# Patient Record
Sex: MALE | Race: WHITE | NOT HISPANIC OR LATINO | URBAN - METROPOLITAN AREA
[De-identification: names, ages, dates, MRNs, and addresses within clinical notes are randomized per-mention and may not be internally consistent; named-entity substitution may affect disease eponyms.]

---

## 2017-11-24 ENCOUNTER — INPATIENT (INPATIENT)
Facility: HOSPITAL | Age: 62
LOS: 3 days | Discharge: HOME | DRG: 855 | End: 2017-11-28
Attending: INTERNAL MEDICINE | Admitting: HOSPITALIST
Payer: COMMERCIAL

## 2017-11-24 VITALS
WEIGHT: 149.91 LBS | OXYGEN SATURATION: 99 % | SYSTOLIC BLOOD PRESSURE: 144 MMHG | DIASTOLIC BLOOD PRESSURE: 75 MMHG | HEART RATE: 108 BPM | HEIGHT: 65 IN | TEMPERATURE: 101 F | RESPIRATION RATE: 16 BRPM

## 2017-11-24 DIAGNOSIS — Z98.890 OTHER SPECIFIED POSTPROCEDURAL STATES: Chronic | ICD-10-CM

## 2017-11-24 LAB
ALBUMIN SERPL ELPH-MCNC: 3.8 G/DL — SIGNIFICANT CHANGE UP (ref 3.3–5)
ALP SERPL-CCNC: 111 U/L — SIGNIFICANT CHANGE UP (ref 40–120)
ALT FLD-CCNC: 29 U/L — SIGNIFICANT CHANGE UP (ref 12–78)
ANION GAP SERPL CALC-SCNC: 9 MMOL/L — SIGNIFICANT CHANGE UP (ref 5–17)
AST SERPL-CCNC: 21 U/L — SIGNIFICANT CHANGE UP (ref 15–37)
BILIRUB SERPL-MCNC: 1 MG/DL — SIGNIFICANT CHANGE UP (ref 0.2–1.2)
BUN SERPL-MCNC: 16 MG/DL — SIGNIFICANT CHANGE UP (ref 7–23)
CALCIUM SERPL-MCNC: 8.9 MG/DL — SIGNIFICANT CHANGE UP (ref 8.5–10.1)
CHLORIDE SERPL-SCNC: 105 MMOL/L — SIGNIFICANT CHANGE UP (ref 96–108)
CO2 SERPL-SCNC: 27 MMOL/L — SIGNIFICANT CHANGE UP (ref 22–31)
CREAT SERPL-MCNC: 1.3 MG/DL — SIGNIFICANT CHANGE UP (ref 0.5–1.3)
GLUCOSE SERPL-MCNC: 140 MG/DL — HIGH (ref 70–99)
HCT VFR BLD CALC: 45.6 % — SIGNIFICANT CHANGE UP (ref 39–50)
HGB BLD-MCNC: 15.1 G/DL — SIGNIFICANT CHANGE UP (ref 13–17)
LACTATE SERPL-SCNC: 1.7 MMOL/L — SIGNIFICANT CHANGE UP (ref 0.7–2)
MCHC RBC-ENTMCNC: 31.7 PG — SIGNIFICANT CHANGE UP (ref 27–34)
MCHC RBC-ENTMCNC: 33 GM/DL — SIGNIFICANT CHANGE UP (ref 32–36)
MCV RBC AUTO: 95.9 FL — SIGNIFICANT CHANGE UP (ref 80–100)
PLATELET # BLD AUTO: 334 K/UL — SIGNIFICANT CHANGE UP (ref 150–400)
POTASSIUM SERPL-MCNC: 3.5 MMOL/L — SIGNIFICANT CHANGE UP (ref 3.5–5.3)
POTASSIUM SERPL-SCNC: 3.5 MMOL/L — SIGNIFICANT CHANGE UP (ref 3.5–5.3)
PROT SERPL-MCNC: 7.7 G/DL — SIGNIFICANT CHANGE UP (ref 6–8.3)
RBC # BLD: 4.75 M/UL — SIGNIFICANT CHANGE UP (ref 4.2–5.8)
RBC # FLD: 12.3 % — SIGNIFICANT CHANGE UP (ref 10.3–14.5)
SODIUM SERPL-SCNC: 141 MMOL/L — SIGNIFICANT CHANGE UP (ref 135–145)
WBC # BLD: 19.1 K/UL — HIGH (ref 3.8–10.5)
WBC # FLD AUTO: 19.1 K/UL — HIGH (ref 3.8–10.5)

## 2017-11-24 PROCEDURE — 71010: CPT | Mod: 26

## 2017-11-24 PROCEDURE — 93010 ELECTROCARDIOGRAM REPORT: CPT

## 2017-11-24 RX ORDER — MUPIROCIN 20 MG/G
1 OINTMENT TOPICAL ONCE
Qty: 0 | Refills: 0 | Status: COMPLETED | OUTPATIENT
Start: 2017-11-24 | End: 2017-11-24

## 2017-11-24 RX ORDER — VANCOMYCIN HCL 1 G
1000 VIAL (EA) INTRAVENOUS ONCE
Qty: 0 | Refills: 0 | Status: COMPLETED | OUTPATIENT
Start: 2017-11-24 | End: 2017-11-24

## 2017-11-24 RX ORDER — SODIUM CHLORIDE 9 MG/ML
1000 INJECTION INTRAMUSCULAR; INTRAVENOUS; SUBCUTANEOUS
Qty: 0 | Refills: 0 | Status: COMPLETED | OUTPATIENT
Start: 2017-11-24 | End: 2017-11-24

## 2017-11-24 RX ORDER — PIPERACILLIN AND TAZOBACTAM 4; .5 G/20ML; G/20ML
3.38 INJECTION, POWDER, LYOPHILIZED, FOR SOLUTION INTRAVENOUS ONCE
Qty: 0 | Refills: 0 | Status: COMPLETED | OUTPATIENT
Start: 2017-11-24 | End: 2017-11-24

## 2017-11-24 RX ADMIN — Medication 250 MILLIGRAM(S): at 22:29

## 2017-11-24 RX ADMIN — MUPIROCIN 1 APPLICATION(S): 20 OINTMENT TOPICAL at 22:52

## 2017-11-24 RX ADMIN — PIPERACILLIN AND TAZOBACTAM 200 GRAM(S): 4; .5 INJECTION, POWDER, LYOPHILIZED, FOR SOLUTION INTRAVENOUS at 21:42

## 2017-11-24 RX ADMIN — SODIUM CHLORIDE 1000 MILLILITER(S): 9 INJECTION INTRAMUSCULAR; INTRAVENOUS; SUBCUTANEOUS at 22:28

## 2017-11-24 RX ADMIN — SODIUM CHLORIDE 1000 MILLILITER(S): 9 INJECTION INTRAMUSCULAR; INTRAVENOUS; SUBCUTANEOUS at 21:41

## 2017-11-24 NOTE — ED PROVIDER NOTE - ENMT, MLM
Airway patent, Nasal  swelling tender, right nares anterior and superior pain and swelling . Mouth with normal mucosa. Throat has no vesicles, no oropharyngeal exudates and uvula is midline.

## 2017-11-24 NOTE — ED PROVIDER NOTE - SIGNIFICANT NEGATIVE FINDINGS
no chest pain, no SOB, no palpitations, no n/v , no urinary symptoms, no bleeding. no neuro changes.

## 2017-11-24 NOTE — ED PROVIDER NOTE - OBJECTIVE STATEMENT
63 y/o WM H/O HTN C/O Nasal pain, swelling, redness, upper lip swelling, intra nasal pain, headache increasing over the past two days. He developed a fever of 100.6F. He usually trims his nasal hairs but he doesn't recall this episode being related to that. No CP, No SOB, No throat pain, no difficulty swallowing.

## 2017-11-24 NOTE — ED ADULT NURSE NOTE - OBJECTIVE STATEMENT
Patient alert and oriented x 4 complaining of right nostril abscess started 4 ago noted with swelling as stated it all started when he was cutting hair from his nostril.

## 2017-11-25 DIAGNOSIS — I10 ESSENTIAL (PRIMARY) HYPERTENSION: ICD-10-CM

## 2017-11-25 DIAGNOSIS — L03.90 CELLULITIS, UNSPECIFIED: ICD-10-CM

## 2017-11-25 DIAGNOSIS — Z29.9 ENCOUNTER FOR PROPHYLACTIC MEASURES, UNSPECIFIED: ICD-10-CM

## 2017-11-25 DIAGNOSIS — M50.30 OTHER CERVICAL DISC DEGENERATION, UNSPECIFIED CERVICAL REGION: ICD-10-CM

## 2017-11-25 DIAGNOSIS — M54.2 CERVICALGIA: ICD-10-CM

## 2017-11-25 LAB
ANION GAP SERPL CALC-SCNC: 6 MMOL/L — SIGNIFICANT CHANGE UP (ref 5–17)
APTT BLD: 31.7 SEC — SIGNIFICANT CHANGE UP (ref 27.5–37.4)
BUN SERPL-MCNC: 12 MG/DL — SIGNIFICANT CHANGE UP (ref 7–23)
CALCIUM SERPL-MCNC: 8.8 MG/DL — SIGNIFICANT CHANGE UP (ref 8.5–10.1)
CHLORIDE SERPL-SCNC: 107 MMOL/L — SIGNIFICANT CHANGE UP (ref 96–108)
CO2 SERPL-SCNC: 29 MMOL/L — SIGNIFICANT CHANGE UP (ref 22–31)
CREAT SERPL-MCNC: 0.95 MG/DL — SIGNIFICANT CHANGE UP (ref 0.5–1.3)
GLUCOSE SERPL-MCNC: 109 MG/DL — HIGH (ref 70–99)
HCT VFR BLD CALC: 41.7 % — SIGNIFICANT CHANGE UP (ref 39–50)
HGB BLD-MCNC: 13.6 G/DL — SIGNIFICANT CHANGE UP (ref 13–17)
INR BLD: 1.12 RATIO — SIGNIFICANT CHANGE UP (ref 0.88–1.16)
MCHC RBC-ENTMCNC: 31.3 PG — SIGNIFICANT CHANGE UP (ref 27–34)
MCHC RBC-ENTMCNC: 32.6 GM/DL — SIGNIFICANT CHANGE UP (ref 32–36)
MCV RBC AUTO: 96.2 FL — SIGNIFICANT CHANGE UP (ref 80–100)
PLATELET # BLD AUTO: 297 K/UL — SIGNIFICANT CHANGE UP (ref 150–400)
POTASSIUM SERPL-MCNC: 3.7 MMOL/L — SIGNIFICANT CHANGE UP (ref 3.5–5.3)
POTASSIUM SERPL-SCNC: 3.7 MMOL/L — SIGNIFICANT CHANGE UP (ref 3.5–5.3)
PROTHROM AB SERPL-ACNC: 12.2 SEC — SIGNIFICANT CHANGE UP (ref 9.8–12.7)
RBC # BLD: 4.34 M/UL — SIGNIFICANT CHANGE UP (ref 4.2–5.8)
RBC # FLD: 12.3 % — SIGNIFICANT CHANGE UP (ref 10.3–14.5)
SODIUM SERPL-SCNC: 142 MMOL/L — SIGNIFICANT CHANGE UP (ref 135–145)
WBC # BLD: 17.4 K/UL — HIGH (ref 3.8–10.5)
WBC # FLD AUTO: 17.4 K/UL — HIGH (ref 3.8–10.5)

## 2017-11-25 PROCEDURE — 12345: CPT | Mod: NC

## 2017-11-25 PROCEDURE — 70487 CT MAXILLOFACIAL W/DYE: CPT | Mod: 26

## 2017-11-25 PROCEDURE — 99285 EMERGENCY DEPT VISIT HI MDM: CPT

## 2017-11-25 PROCEDURE — 99223 1ST HOSP IP/OBS HIGH 75: CPT | Mod: AI,GC

## 2017-11-25 RX ORDER — OXYCODONE AND ACETAMINOPHEN 5; 325 MG/1; MG/1
1 TABLET ORAL EVERY 6 HOURS
Qty: 0 | Refills: 0 | Status: DISCONTINUED | OUTPATIENT
Start: 2017-11-25 | End: 2017-11-28

## 2017-11-25 RX ORDER — LACTOBACILLUS ACIDOPHILUS 100MM CELL
1 CAPSULE ORAL
Qty: 0 | Refills: 0 | Status: DISCONTINUED | OUTPATIENT
Start: 2017-11-25 | End: 2017-11-28

## 2017-11-25 RX ORDER — ACETAMINOPHEN 500 MG
650 TABLET ORAL EVERY 6 HOURS
Qty: 0 | Refills: 0 | Status: DISCONTINUED | OUTPATIENT
Start: 2017-11-25 | End: 2017-11-28

## 2017-11-25 RX ORDER — LOSARTAN POTASSIUM 100 MG/1
50 TABLET, FILM COATED ORAL DAILY
Qty: 0 | Refills: 0 | Status: DISCONTINUED | OUTPATIENT
Start: 2017-11-25 | End: 2017-11-28

## 2017-11-25 RX ORDER — HYDROCHLOROTHIAZIDE 25 MG
12.5 TABLET ORAL DAILY
Qty: 0 | Refills: 0 | Status: DISCONTINUED | OUTPATIENT
Start: 2017-11-25 | End: 2017-11-28

## 2017-11-25 RX ORDER — ASPIRIN/CALCIUM CARB/MAGNESIUM 324 MG
81 TABLET ORAL DAILY
Qty: 0 | Refills: 0 | Status: DISCONTINUED | OUTPATIENT
Start: 2017-11-25 | End: 2017-11-28

## 2017-11-25 RX ORDER — MUPIROCIN 20 MG/G
1 OINTMENT TOPICAL
Qty: 0 | Refills: 0 | Status: DISCONTINUED | OUTPATIENT
Start: 2017-11-25 | End: 2017-11-28

## 2017-11-25 RX ORDER — HYDROGEN PEROXIDE 0.3 KG/100L
1 LIQUID TOPICAL
Qty: 0 | Refills: 0 | Status: DISCONTINUED | OUTPATIENT
Start: 2017-11-25 | End: 2017-11-28

## 2017-11-25 RX ORDER — VANCOMYCIN HCL 1 G
1000 VIAL (EA) INTRAVENOUS EVERY 12 HOURS
Qty: 0 | Refills: 0 | Status: DISCONTINUED | OUTPATIENT
Start: 2017-11-25 | End: 2017-11-26

## 2017-11-25 RX ORDER — AMLODIPINE BESYLATE 2.5 MG/1
5 TABLET ORAL DAILY
Qty: 0 | Refills: 0 | Status: DISCONTINUED | OUTPATIENT
Start: 2017-11-25 | End: 2017-11-28

## 2017-11-25 RX ORDER — PIPERACILLIN AND TAZOBACTAM 4; .5 G/20ML; G/20ML
3.38 INJECTION, POWDER, LYOPHILIZED, FOR SOLUTION INTRAVENOUS EVERY 8 HOURS
Qty: 0 | Refills: 0 | Status: DISCONTINUED | OUTPATIENT
Start: 2017-11-25 | End: 2017-11-26

## 2017-11-25 RX ORDER — ENOXAPARIN SODIUM 100 MG/ML
40 INJECTION SUBCUTANEOUS EVERY 24 HOURS
Qty: 0 | Refills: 0 | Status: DISCONTINUED | OUTPATIENT
Start: 2017-11-25 | End: 2017-11-28

## 2017-11-25 RX ADMIN — HYDROGEN PEROXIDE 1 APPLICATION(S): 0.3 LIQUID TOPICAL at 14:33

## 2017-11-25 RX ADMIN — LOSARTAN POTASSIUM 50 MILLIGRAM(S): 100 TABLET, FILM COATED ORAL at 06:11

## 2017-11-25 RX ADMIN — PIPERACILLIN AND TAZOBACTAM 25 GRAM(S): 4; .5 INJECTION, POWDER, LYOPHILIZED, FOR SOLUTION INTRAVENOUS at 06:12

## 2017-11-25 RX ADMIN — MUPIROCIN 1 APPLICATION(S): 20 OINTMENT TOPICAL at 11:02

## 2017-11-25 RX ADMIN — MUPIROCIN 1 APPLICATION(S): 20 OINTMENT TOPICAL at 23:33

## 2017-11-25 RX ADMIN — PIPERACILLIN AND TAZOBACTAM 25 GRAM(S): 4; .5 INJECTION, POWDER, LYOPHILIZED, FOR SOLUTION INTRAVENOUS at 23:32

## 2017-11-25 RX ADMIN — Medication 12.5 MILLIGRAM(S): at 06:11

## 2017-11-25 RX ADMIN — Medication 650 MILLIGRAM(S): at 04:05

## 2017-11-25 RX ADMIN — Medication 250 MILLIGRAM(S): at 11:02

## 2017-11-25 RX ADMIN — MUPIROCIN 1 APPLICATION(S): 20 OINTMENT TOPICAL at 17:25

## 2017-11-25 RX ADMIN — Medication 1 TABLET(S): at 17:24

## 2017-11-25 RX ADMIN — Medication 650 MILLIGRAM(S): at 23:39

## 2017-11-25 RX ADMIN — AMLODIPINE BESYLATE 5 MILLIGRAM(S): 2.5 TABLET ORAL at 06:12

## 2017-11-25 RX ADMIN — Medication 81 MILLIGRAM(S): at 11:03

## 2017-11-25 RX ADMIN — Medication 250 MILLIGRAM(S): at 22:16

## 2017-11-25 RX ADMIN — MUPIROCIN 1 APPLICATION(S): 20 OINTMENT TOPICAL at 06:12

## 2017-11-25 RX ADMIN — Medication 1 TABLET(S): at 08:47

## 2017-11-25 RX ADMIN — HYDROGEN PEROXIDE 1 APPLICATION(S): 0.3 LIQUID TOPICAL at 22:17

## 2017-11-25 RX ADMIN — PIPERACILLIN AND TAZOBACTAM 25 GRAM(S): 4; .5 INJECTION, POWDER, LYOPHILIZED, FOR SOLUTION INTRAVENOUS at 14:33

## 2017-11-25 NOTE — H&P ADULT - PROBLEM SELECTOR PLAN 1
continue vanc/zosyn  ENT consulted by ED  f/u ENT consult recs (Dr. Heart)  mupirocin  tylenol PRN for pain, fever

## 2017-11-25 NOTE — CHART NOTE - NSCHARTNOTEFT_GEN_A_CORE
Hospitalist Attending Chart Update / Progress Note    Please see H&P written today by Dr. Reyes for more information.    Pt seen, interviewed, examined by me.  In short,   S: pt states fever has resolved but his chief complaint of pain and tenderness and swelling in his nose and jenae-nasal area remains unchanged.     O:     MEDICATIONS  (STANDING):  amLODIPine   Tablet 5 milliGRAM(s) Oral daily  aspirin enteric coated 81 milliGRAM(s) Oral daily  enoxaparin Injectable 40 milliGRAM(s) SubCutaneous every 24 hours  hydrochlorothiazide 12.5 milliGRAM(s) Oral daily  lactobacillus acidophilus 1 Tablet(s) Oral two times a day with meals  losartan 50 milliGRAM(s) Oral daily  mupirocin 2% Ointment 1 Application(s) Topical four times a day  piperacillin/tazobactam IVPB. 3.375 Gram(s) IV Intermittent every 8 hours  vancomycin  IVPB 1000 milliGRAM(s) IV Intermittent every 12 hours      MEDICATIONS  (PRN):  acetaminophen   Tablet 650 milliGRAM(s) Oral every 6 hours PRN For Temp greater than 38 C (100.4 F)  acetaminophen   Tablet. 650 milliGRAM(s) Oral every 6 hours PRN Moderate Pain (4 - 6)  hydrogen peroxide 3% Solution 1 Application(s) Topical <User Schedule> PRN Right Nasal Abscess  percocet 5/325mg po q6h PRN severe pain      Vital Signs Last 24 Hrs  T(C): 37.3 (25 Nov 2017 13:20), Max: 38.2 (24 Nov 2017 20:54)  T(F): 99.2 (25 Nov 2017 13:20), Max: 100.7 (24 Nov 2017 20:54)  HR: 77 (25 Nov 2017 13:20) (62 - 108)  BP: 118/65 (25 Nov 2017 13:20) (118/65 - 149/83)  BP(mean): --  RR: 17 (25 Nov 2017 13:20) (14 - 20)  SpO2: 95% (25 Nov 2017 13:20) (95% - 99%)    PHYSICAL EXAM:  GENERAL: NAD  HEENT:  EOMI, tender, warm, erythematous, swollen nose worse at the tip. No significant drainage at this time.   CHEST/LUNG:  CTA b/l, no rales, wheezes, or rhonchi  HEART:  RRR, S1, S2  ABDOMEN:  BS+, soft, nontender, nondistended  EXTREMITIES: no edema, cyanosis, or calf tenderness  NERVOUS SYSTEM: AA&Ox3    LABS:                        13.6   17.4  )-----------( 297      ( 25 Nov 2017 12:30 )             41.7     CBC Full  -  ( 25 Nov 2017 12:30 )  WBC Count : 17.4 K/uL  Hemoglobin : 13.6 g/dL  Hematocrit : 41.7 %  Platelet Count - Automated : 297 K/uL  Mean Cell Volume : 96.2 fl  Mean Cell Hemoglobin : 31.3 pg  Mean Cell Hemoglobin Concentration : 32.6 gm/dL  Auto Neutrophil # : x  Auto Lymphocyte # : x  Auto Monocyte # : x  Auto Eosinophil # : x  Auto Basophil # : x  Auto Neutrophil % : x  Auto Lymphocyte % : x  Auto Monocyte % : x  Auto Eosinophil % : x  Auto Basophil % : x    25 Nov 2017 12:31    142    |  107    |  12     ----------------------------<  109    3.7     |  29     |  0.95     Ca    8.8        25 Nov 2017 12:31    TPro  7.7    /  Alb  3.8    /  TBili  1.0    /  DBili  x      /  AST  21     /  ALT  29     /  AlkPhos  111    24 Nov 2017 21:50    PT/INR - ( 25 Nov 2017 12:30 )   PT: 12.2 sec;   INR: 1.12 ratio         PTT - ( 25 Nov 2017 12:30 )  PTT:31.7 sec    RADIOLOGY & ADDITIONAL TESTS:  CT Maxillofacial: Approximately 1.0 cm abscess in the subcutaneous tissues at the tip of the nose. Surrounding swelling compatible with cellulitis.    Personally reviewed.       A/P:  61yo M w/ PMH of HTN, BPH, DJD, chronic neck pain presents with 3 days of nasal swelling and pain and 1 day of fever a/w sepsis due to facial cellulitis with abscess.  -discussed case with ENT, Dr. Colin, who plans to drain the abscess with local anesthesia this afternoon. -cultures of abscess to be sent  -f/up abscess culture, suspect organism will be staph given abscess formation, and hopefully will be MSSA; also f/up BCx  -fever resolved, mild improvement in leukocytosis; c/w broad spectrum Abx with vanco/zosyn pending at least gram stain of organism; will de-escalate to vanco tomorrow if G+ organism  -re: HTN, c/w anti-hypertensives with hold parameters  -no home meds for BPH, monitor  -will start percocet PRN severe pain (has been on Vicodin in the past, checked iSTOP - last picked up in Sept 5th, 2017) for degenerative joint disease     Care Discussed with [x] Consultants  [x] Patient  [x] Family  [ ]      [ ] Other; RN  DVT ppx: lovenox Hospitalist Attending Chart Update / Progress Note    Please see H&P written today by Dr. Reyes for more information.    Pt seen, interviewed, examined by me.  In short,   S: pt states fever has resolved but his chief complaint of pain and tenderness and swelling in his nose and jenae-nasal area remains unchanged.     O:     MEDICATIONS  (STANDING):  amLODIPine   Tablet 5 milliGRAM(s) Oral daily  aspirin enteric coated 81 milliGRAM(s) Oral daily  enoxaparin Injectable 40 milliGRAM(s) SubCutaneous every 24 hours  hydrochlorothiazide 12.5 milliGRAM(s) Oral daily  lactobacillus acidophilus 1 Tablet(s) Oral two times a day with meals  losartan 50 milliGRAM(s) Oral daily  mupirocin 2% Ointment 1 Application(s) Topical four times a day  piperacillin/tazobactam IVPB. 3.375 Gram(s) IV Intermittent every 8 hours  vancomycin  IVPB 1000 milliGRAM(s) IV Intermittent every 12 hours    MEDICATIONS  (PRN):  acetaminophen   Tablet 650 milliGRAM(s) Oral every 6 hours PRN For Temp greater than 38 C (100.4 F)  acetaminophen   Tablet. 650 milliGRAM(s) Oral every 6 hours PRN Moderate Pain (4 - 6)  hydrogen peroxide 3% Solution 1 Application(s) Topical <User Schedule> PRN Right Nasal Abscess  percocet 5/325mg po q6h PRN severe pain      Vital Signs Last 24 Hrs  T(C): 37.3 (25 Nov 2017 13:20), Max: 38.2 (24 Nov 2017 20:54)  T(F): 99.2 (25 Nov 2017 13:20), Max: 100.7 (24 Nov 2017 20:54)  HR: 77 (25 Nov 2017 13:20) (62 - 108)  BP: 118/65 (25 Nov 2017 13:20) (118/65 - 149/83)  BP(mean): --  RR: 17 (25 Nov 2017 13:20) (14 - 20)  SpO2: 95% (25 Nov 2017 13:20) (95% - 99%)    PHYSICAL EXAM:  GENERAL: NAD  HEENT:  EOMI, tender, warm, erythematous, swollen nose worse at the tip. No significant drainage at this time.   CHEST/LUNG:  CTA b/l, no rales, wheezes, or rhonchi  HEART:  RRR, S1, S2  ABDOMEN:  BS+, soft, nontender, nondistended  EXTREMITIES: no edema, cyanosis, or calf tenderness  NERVOUS SYSTEM: AA&Ox3    LABS:                        13.6   17.4  )-----------( 297      ( 25 Nov 2017 12:30 )             41.7     CBC Full  -  ( 25 Nov 2017 12:30 )  WBC Count : 17.4 K/uL  Hemoglobin : 13.6 g/dL  Hematocrit : 41.7 %  Platelet Count - Automated : 297 K/uL  Mean Cell Volume : 96.2 fl  Mean Cell Hemoglobin : 31.3 pg  Mean Cell Hemoglobin Concentration : 32.6 gm/dL  Auto Neutrophil # : x  Auto Lymphocyte # : x  Auto Monocyte # : x  Auto Eosinophil # : x  Auto Basophil # : x  Auto Neutrophil % : x  Auto Lymphocyte % : x  Auto Monocyte % : x  Auto Eosinophil % : x  Auto Basophil % : x    25 Nov 2017 12:31    142    |  107    |  12     ----------------------------<  109    3.7     |  29     |  0.95     Ca    8.8        25 Nov 2017 12:31    TPro  7.7    /  Alb  3.8    /  TBili  1.0    /  DBili  x      /  AST  21     /  ALT  29     /  AlkPhos  111    24 Nov 2017 21:50    PT/INR - ( 25 Nov 2017 12:30 )   PT: 12.2 sec;   INR: 1.12 ratio         PTT - ( 25 Nov 2017 12:30 )  PTT:31.7 sec    RADIOLOGY & ADDITIONAL TESTS:  CT Maxillofacial: Approximately 1.0 cm abscess in the subcutaneous tissues at the tip of the nose. Surrounding swelling compatible with cellulitis.    Personally reviewed.       A/P:  63yo M w/ PMH of HTN, BPH, DJD, chronic neck pain presents with 3 days of nasal swelling and pain and 1 day of fever a/w sepsis due to facial cellulitis with abscess.  -discussed case with ENT, Dr. Colin, who plans to drain the abscess with local anesthesia this afternoon. -cultures of abscess to be sent  -f/up abscess culture, suspect organism will be staph given abscess formation, and hopefully will be MSSA; also f/up BCx  -fever resolved, mild improvement in leukocytosis; c/w broad spectrum Abx with vanco/zosyn pending at least gram stain of organism; will de-escalate to vanco tomorrow if G+ organism  -re: HTN, c/w anti-hypertensives with hold parameters  -no home meds for BPH, monitor  -will start percocet PRN severe pain (has been on Vicodin in the past, checked iSTOP - last picked up in Sept 5th, 2017) for degenerative joint disease     Care Discussed with [x] Consultants  [x] Patient  [x] Family  [ ]      [ ] Other; RN  DVT ppx: lovenox

## 2017-11-25 NOTE — H&P ADULT - NSHPPHYSICALEXAM_GEN_ALL_CORE
Physical Exam:  General: Well developed, well nourished, NAD  HEENT: NCAT, PERRLA, EOMI bl, moist mucous membranes   Neck: Supple, nontender, no mass  Neurology: A&Ox3, nonfocal, CN II-XII grossly intact, sensation intact, no gait abnormalities   Respiratory: CTA B/L, No W/R/R  CV: RRR, +S1/S2, no murmurs, rubs or gallops  Abdominal: Soft, NT, ND +BSx4  Extremities: No C/C/E, + peripheral pulses  MSK: Normal ROM, no joint erythema or warmth, no joint swelling   Skin: warm, dry, normal color, no rash or abnormal lesions Physical Exam:  General: Well developed, well nourished, NAD  HEENT: NCAT, PERRLA, EOMI bl, moist mucous membranes; tip of nose erythematous, warm to touch; nasal turbinates appear injected; no TTP over nose, maxillary, or frontal sinuses. No swelling of mouth, orbits, or deficits in EOM.  Neck: Supple, nontender, no mass  Neurology: A&Ox3, nonfocal, CN II-XII grossly intact, sensation intact  Respiratory: CTA B/L, No W/R/R  CV: RRR, +S1/S2, no murmurs, rubs or gallops  Abdominal: Soft, NT, ND +BSx4; small umbilical hernia  Extremities: No C/C/E, + peripheral pulses  MSK: Normal ROM, no joint erythema or warmth, no joint swelling   Skin: warm, dry, normal color, no rash or abnormal lesions except as noted above

## 2017-11-25 NOTE — H&P ADULT - NSHPSOCIALHISTORY_GEN_ALL_CORE
Pt lives with _______________ and is __________ at baseline.     Tobacco:  Occupation:  Alcohol:  Drugs: Pt lives with wife and is independent at baseline.     Tobacco: current smoker; 45PY history  Occupation: unloads freight  Alcohol: rarely  Drugs: denies

## 2017-11-25 NOTE — H&P ADULT - HISTORY OF PRESENT ILLNESS
62y M PMH    Denied fever, chills, chest pain, palpitations, SOB, cough, abdominal pain, nausea, vomiting, diarrhea, constipation, urinary frequency, urgency, or dysuria, headaches, changes in vision, dizziness, numbness, tingling, recent travel, recent antibiotic use, or sick contacts.    ED Course:  Vitals on presentation: T100.7F oral, , /75, RR 16, SpO2 99% on RA.  Labs significant for WBC 19.1, remainder of CBC, CMP WNL.  Lactate 1.7.  Blood cultures collected.  CT face: Approximately 1.0 cm abscess in the subcutaneous tissues at the tip of   the nose. Surrounding swelling compatible with cellulitis.  Pt received mupirocin ointment, vancomycin/zosyn x1, NS 1L bolus x1.  ENT ([facility]) aware of case; will see pt in AM. 62y M PMH HTN, BPH, DJD, chronic neck pain presents with 3 days of nasal swelling and pain.  Pt states he trimmed hose nose hairs with scissors ~6 days prior to admission.  One day later, he felt what he thought was a scab on the inside of his right nostril which he accidentally dislodged while blowing his nose.  Pt subsequently began experiencing noticeable swelling of the tip of his nose accompanied by mild temporal headache and marked fatigue.  Pt tried treating the wound at home with neosporin and warm compresses.  On day of presentation, pt began to feel chills and marked fatigue so came to ED.  Pt denies any changes in vision, smell, sinus pressure or mouth pain.  He is able to tolerate PO. Denied chest pain, palpitations, SOB, cough, abdominal pain, nausea, vomiting, diarrhea, constipation, dizziness, numbness, tingling.    ED Course:  Vitals on presentation: T100.7F oral, , /75, RR 16, SpO2 99% on RA.  Labs significant for WBC 19.1, remainder of CBC, CMP WNL.  Lactate 1.7.  Blood cultures collected.  CT face: Approximately 1.0 cm abscess in the subcutaneous tissues at the tip of   the nose. Surrounding swelling compatible with cellulitis.  Pt received mupirocin ointment, vancomycin/zosyn x1, NS 1L bolus x1.  ENT ([facility]) aware of case; will see pt in AM. 62y M PMH HTN, BPH, DJD, chronic neck pain presents with 3 days of nasal swelling and pain.  Pt states he trimmed hose nose hairs with scissors ~6 days prior to admission.  One day later, he felt what he thought was a scab on the inside of his right nostril which he accidentally dislodged while blowing his nose.  Pt subsequently began experiencing swelling, pain, and pressure at the tip of his nose accompanied by mild temporal headache and marked fatigue.  Pt tried treating the wound at home with neosporin and warm compresses.  On day of presentation, pt began to feel chills and marked fatigue so came to ED.  Pt denies any changes in vision, smell, sinus pressure or mouth pain.  He is able to tolerate PO. Denied chest pain, palpitations, SOB, cough, abdominal pain, nausea, vomiting, diarrhea, constipation, dizziness, numbness, tingling.    ED Course:  Vitals on presentation: T100.7F oral, , /75, RR 16, SpO2 99% on RA.  Labs significant for WBC 19.1, remainder of CBC, CMP WNL.  Lactate 1.7.  Blood cultures collected.  CT face: Approximately 1.0 cm abscess in the subcutaneous tissues at the tip of   the nose. Surrounding swelling compatible with cellulitis.  Pt received mupirocin ointment, vancomycin/zosyn x1, NS 1L bolus x1.  ENT ([facility]) aware of case; will see pt in AM.

## 2017-11-25 NOTE — H&P ADULT - PMH
HTN (hypertension) BPH (benign prostatic hyperplasia)    Chronic neck pain    Degenerative disc disease, cervical    HTN (hypertension)

## 2017-11-25 NOTE — H&P ADULT - NSHPREVIEWOFSYSTEMS_GEN_ALL_CORE
Constitutional: denies fever, chills, diaphoresis   HEENT: denies blurry vision, difficulty hearing  Respiratory: denies SOB, MARINELLI, cough, sputum production, wheezing, hemoptysis  Cardiovascular: denies CP, palpitations, edema  Gastrointestinal: denies nausea, vomiting, diarrhea, constipation, abdominal pain, melena, hematochezia   Genitourinary: denies dysuria, frequency, urgency, hematuria   Skin/Breast: denies rash, itching  Musculoskeletal: denies myalgias, joint swelling, muscle weakness  Neurologic: denies headache, weakness, dizziness, paresthesias, numbness/tingling  Psychiatric: denies feeling anxious, depressed, suicidal, homicidal thoughts  Hematology/Oncology: denies bruising, tender or enlarged lymph nodes   ROS negative except as noted above Constitutional: (+) fever ,chills; denies diaphoresis   HEENT: denies blurry vision, difficulty hearing  Respiratory: denies SOB, MARINELLI, cough, sputum production, wheezing, hemoptysis  Cardiovascular: denies CP, palpitations, edema  Gastrointestinal: denies nausea, vomiting, diarrhea, constipation, abdominal pain, melena, hematochezia   Genitourinary: denies dysuria, frequency, urgency, hematuria   Skin/Breast: denies rash, itching  Musculoskeletal: denies myalgias, joint swelling, muscle weakness; (+) neck pain (at baseline)  Neurologic: denies headache, weakness, dizziness, paresthesias, numbness/tingling  Psychiatric: denies feeling anxious, depressed, suicidal, homicidal thoughts  Hematology/Oncology: denies bruising, tender or enlarged lymph nodes   ROS negative except as noted above

## 2017-11-25 NOTE — H&P ADULT - PROBLEM SELECTOR PLAN 3
chronic  tylenol PRN  pt takes vicodin 7.5-300 at home (confirmed with [facility]); may try this if Tylenol doesn't work

## 2017-11-26 DIAGNOSIS — N40.0 BENIGN PROSTATIC HYPERPLASIA WITHOUT LOWER URINARY TRACT SYMPTOMS: ICD-10-CM

## 2017-11-26 DIAGNOSIS — A41.9 SEPSIS, UNSPECIFIED ORGANISM: ICD-10-CM

## 2017-11-26 LAB
ANION GAP SERPL CALC-SCNC: 8 MMOL/L — SIGNIFICANT CHANGE UP (ref 5–17)
BASOPHILS # BLD AUTO: 0.2 K/UL — SIGNIFICANT CHANGE UP (ref 0–0.2)
BASOPHILS NFR BLD AUTO: 1 % — SIGNIFICANT CHANGE UP (ref 0–2)
BUN SERPL-MCNC: 15 MG/DL — SIGNIFICANT CHANGE UP (ref 7–23)
CALCIUM SERPL-MCNC: 8.7 MG/DL — SIGNIFICANT CHANGE UP (ref 8.5–10.1)
CHLORIDE SERPL-SCNC: 107 MMOL/L — SIGNIFICANT CHANGE UP (ref 96–108)
CO2 SERPL-SCNC: 28 MMOL/L — SIGNIFICANT CHANGE UP (ref 22–31)
CREAT SERPL-MCNC: 1.2 MG/DL — SIGNIFICANT CHANGE UP (ref 0.5–1.3)
EOSINOPHIL # BLD AUTO: 0.4 K/UL — SIGNIFICANT CHANGE UP (ref 0–0.5)
EOSINOPHIL NFR BLD AUTO: 2.1 % — SIGNIFICANT CHANGE UP (ref 0–6)
GLUCOSE SERPL-MCNC: 96 MG/DL — SIGNIFICANT CHANGE UP (ref 70–99)
HCT VFR BLD CALC: 44 % — SIGNIFICANT CHANGE UP (ref 39–50)
HGB BLD-MCNC: 14.3 G/DL — SIGNIFICANT CHANGE UP (ref 13–17)
LYMPHOCYTES # BLD AUTO: 23.4 % — SIGNIFICANT CHANGE UP (ref 13–44)
LYMPHOCYTES # BLD AUTO: 4.1 K/UL — HIGH (ref 1–3.3)
MCHC RBC-ENTMCNC: 31.3 PG — SIGNIFICANT CHANGE UP (ref 27–34)
MCHC RBC-ENTMCNC: 32.5 GM/DL — SIGNIFICANT CHANGE UP (ref 32–36)
MCV RBC AUTO: 96.2 FL — SIGNIFICANT CHANGE UP (ref 80–100)
MONOCYTES # BLD AUTO: 1 K/UL — HIGH (ref 0–0.9)
MONOCYTES NFR BLD AUTO: 5.9 % — SIGNIFICANT CHANGE UP (ref 1–9)
NEUTROPHILS # BLD AUTO: 11.8 K/UL — HIGH (ref 1.8–7.4)
NEUTROPHILS NFR BLD AUTO: 67.6 % — SIGNIFICANT CHANGE UP (ref 43–77)
PLATELET # BLD AUTO: 309 K/UL — SIGNIFICANT CHANGE UP (ref 150–400)
POTASSIUM SERPL-MCNC: 3.6 MMOL/L — SIGNIFICANT CHANGE UP (ref 3.5–5.3)
POTASSIUM SERPL-SCNC: 3.6 MMOL/L — SIGNIFICANT CHANGE UP (ref 3.5–5.3)
RBC # BLD: 4.57 M/UL — SIGNIFICANT CHANGE UP (ref 4.2–5.8)
RBC # FLD: 12.2 % — SIGNIFICANT CHANGE UP (ref 10.3–14.5)
SODIUM SERPL-SCNC: 143 MMOL/L — SIGNIFICANT CHANGE UP (ref 135–145)
VANCOMYCIN TROUGH SERPL-MCNC: 8.2 UG/ML — LOW (ref 10–20)
WBC # BLD: 17.4 K/UL — HIGH (ref 3.8–10.5)
WBC # FLD AUTO: 17.4 K/UL — HIGH (ref 3.8–10.5)

## 2017-11-26 PROCEDURE — 99233 SBSQ HOSP IP/OBS HIGH 50: CPT

## 2017-11-26 RX ORDER — VANCOMYCIN HCL 1 G
1250 VIAL (EA) INTRAVENOUS EVERY 12 HOURS
Qty: 0 | Refills: 0 | Status: DISCONTINUED | OUTPATIENT
Start: 2017-11-26 | End: 2017-11-28

## 2017-11-26 RX ORDER — DOCUSATE SODIUM 100 MG
100 CAPSULE ORAL
Qty: 0 | Refills: 0 | Status: DISCONTINUED | OUTPATIENT
Start: 2017-11-26 | End: 2017-11-28

## 2017-11-26 RX ADMIN — Medication 166.67 MILLIGRAM(S): at 11:10

## 2017-11-26 RX ADMIN — Medication 100 MILLIGRAM(S): at 11:10

## 2017-11-26 RX ADMIN — Medication 100 MILLIGRAM(S): at 17:20

## 2017-11-26 RX ADMIN — MUPIROCIN 1 APPLICATION(S): 20 OINTMENT TOPICAL at 05:54

## 2017-11-26 RX ADMIN — Medication 1 TABLET(S): at 17:20

## 2017-11-26 RX ADMIN — Medication 166.67 MILLIGRAM(S): at 22:16

## 2017-11-26 RX ADMIN — HYDROGEN PEROXIDE 1 APPLICATION(S): 0.3 LIQUID TOPICAL at 08:39

## 2017-11-26 RX ADMIN — Medication 81 MILLIGRAM(S): at 11:10

## 2017-11-26 RX ADMIN — MUPIROCIN 1 APPLICATION(S): 20 OINTMENT TOPICAL at 11:10

## 2017-11-26 RX ADMIN — PIPERACILLIN AND TAZOBACTAM 25 GRAM(S): 4; .5 INJECTION, POWDER, LYOPHILIZED, FOR SOLUTION INTRAVENOUS at 14:07

## 2017-11-26 RX ADMIN — MUPIROCIN 1 APPLICATION(S): 20 OINTMENT TOPICAL at 17:21

## 2017-11-26 RX ADMIN — PIPERACILLIN AND TAZOBACTAM 25 GRAM(S): 4; .5 INJECTION, POWDER, LYOPHILIZED, FOR SOLUTION INTRAVENOUS at 06:08

## 2017-11-26 RX ADMIN — Medication 1 TABLET(S): at 08:38

## 2017-11-26 NOTE — PROGRESS NOTE ADULT - PROBLEM SELECTOR PLAN 4
-will start percocet PRN severe pain (has been on Vicodin in the past, checked iSTOP - last picked up in Sept 5th, 2017) for degenerative joint disease

## 2017-11-26 NOTE — PROGRESS NOTE ADULT - SUBJECTIVE AND OBJECTIVE BOX
Visit Information    Have you changed or started any medications since your last visit including any over-the-counter medicines, vitamins, or herbal medicines? no   Are you having any side effects from any of your medications? -  no  Have you stopped taking any of your medications? Is so, why? -  no    Have you seen any other physician or provider since your last visit? No  Have you had any other diagnostic tests since your last visit? No  Have you been seen in the emergency room and/or had an admission to a hospital since we last saw you? Yes - Records Obtained  Have you had your routine dental cleaning in the past 6 months? no    Have you activated your [employer] account? If not, what are your barriers?  Yes     Patient Care Team:  Balta Barnett MD as PCP - General  Balta Barnett MD as Referring Physician (Internal Medicine)    Medical History Review  Past Medical, Family, and Social History reviewed and does contribute to the patient presenting condition    Health Maintenance   Topic Date Due    Flu vaccine (1) 09/01/2017    Hepatitis C screen  12/14/2017 (Originally 1955)    HIV screen  12/14/2017 (Originally 4/21/1970)    Zostavax vaccine  12/20/2017 (Originally 4/21/2017)    DTaP/Tdap/Td vaccine (1 - Tdap) 12/14/2022 (Originally 9/14/2014)    Lipid screen  04/17/2022    Colon cancer screen colonoscopy  11/06/2024 Patient is a 62y old  Male who presents with a chief complaint of fever, facial pain/swelling (25 Nov 2017 01:25)      INTERVAL HPI/OVERNIGHT EVENTS: pt states fever has resolved and pain in his nose and jenae-nasal area greatly improved after drainage of abscess. No CP, SOB, diarrhea.     MEDICATIONS  (STANDING):  amLODIPine   Tablet 5 milliGRAM(s) Oral daily  aspirin enteric coated 81 milliGRAM(s) Oral daily  docusate sodium 100 milliGRAM(s) Oral two times a day  enoxaparin Injectable 40 milliGRAM(s) SubCutaneous every 24 hours  hydrochlorothiazide 12.5 milliGRAM(s) Oral daily  lactobacillus acidophilus 1 Tablet(s) Oral two times a day with meals  losartan 50 milliGRAM(s) Oral daily  mupirocin 2% Ointment 1 Application(s) Topical four times a day  vancomycin  IVPB 1250 milliGRAM(s) IV Intermittent every 12 hours    MEDICATIONS  (PRN):  acetaminophen   Tablet 650 milliGRAM(s) Oral every 6 hours PRN For Temp greater than 38 C (100.4 F)  acetaminophen   Tablet. 650 milliGRAM(s) Oral every 6 hours PRN Moderate Pain (4 - 6)  hydrogen peroxide 3% Solution 1 Application(s) Topical <User Schedule> PRN Right Nasal Abscess  oxyCODONE    5 mG/acetaminophen 325 mG 1 Tablet(s) Oral every 6 hours PRN Severe Pain (7 - 10)      Allergies    No Known Allergies    Intolerances        REVIEW OF SYSTEMS:  CONSTITUTIONAL: No fever or chills  HEENT:  still having pain in the nose, but greatly improved after drainage of abscess  RESPIRATORY: No cough, wheezing, or shortness of breath  CARDIOVASCULAR: No chest pain, palpitations, or leg swelling  GASTROINTESTINAL: No abd pain, nausea, vomiting, or diarrhea  GENITOURINARY: No dysuria, frequency, or hematuria  NEUROLOGICAL: no focal weakness or dizziness  MUSCULOSKELETAL: no myalgias     Vital Signs Last 24 Hrs  T(C): 37.2 (26 Nov 2017 14:36), Max: 37.8 (25 Nov 2017 20:44)  T(F): 99 (26 Nov 2017 14:36), Max: 100 (25 Nov 2017 20:44)  HR: 69 (26 Nov 2017 14:36) (57 - 82)  BP: 104/68 (26 Nov 2017 14:36) (104/68 - 120/78)  BP(mean): --  RR: 17 (26 Nov 2017 14:36) (16 - 17)  SpO2: 96% (26 Nov 2017 14:36) (95% - 96%)    PHYSICAL EXAM:  GENERAL: NAD  HEENT:  EOMI, moist mucous membranes  CHEST/LUNG:  CTA b/l, no rales, wheezes, or rhonchi  HEART:  RRR, S1, S2  ABDOMEN:  BS+, soft, nontender, nondistended  EXTREMITIES: no edema, cyanosis, or calf tenderness  NERVOUS SYSTEM: AA&Ox3    LABS:                        14.3   17.4  )-----------( 309      ( 26 Nov 2017 08:30 )             44.0     CBC Full  -  ( 26 Nov 2017 08:30 )  WBC Count : 17.4 K/uL  Hemoglobin : 14.3 g/dL  Hematocrit : 44.0 %  Platelet Count - Automated : 309 K/uL  Mean Cell Volume : 96.2 fl  Mean Cell Hemoglobin : 31.3 pg  Mean Cell Hemoglobin Concentration : 32.5 gm/dL  Auto Neutrophil # : 11.8 K/uL  Auto Lymphocyte # : 4.1 K/uL  Auto Monocyte # : 1.0 K/uL  Auto Eosinophil # : 0.4 K/uL  Auto Basophil # : 0.2 K/uL  Auto Neutrophil % : 67.6 %  Auto Lymphocyte % : 23.4 %  Auto Monocyte % : 5.9 %  Auto Eosinophil % : 2.1 %  Auto Basophil % : 1.0 %    26 Nov 2017 08:30    143    |  107    |  15     ----------------------------<  96     3.6     |  28     |  1.20     Ca    8.7        26 Nov 2017 08:30      PT/INR - ( 25 Nov 2017 12:30 )   PT: 12.2 sec;   INR: 1.12 ratio         PTT - ( 25 Nov 2017 12:30 )  PTT:31.7 sec    CAPILLARY BLOOD GLUCOSE            Culture - Abscess with Gram Stain (collected 11-25-17 @ 23:04)  Source: .Abscess Right Nostril  Preliminary Report (11-26-17 @ 17:30):    Few Staphylococcus aureus    Culture - Blood (collected 11-25-17 @ 10:03)  Source: .Blood Blood  Preliminary Report (11-26-17 @ 11:00):    No growth to date.    Culture - Blood (collected 11-25-17 @ 10:03)  Source: .Blood Blood  Preliminary Report (11-26-17 @ 11:00):    No growth to date.        RADIOLOGY & ADDITIONAL TESTS:  CT Maxillofacial: Approximately 1.0 cm abscess in the subcutaneous tissues at the tip of the nose. Surrounding swelling compatible with cellulitis.    Personally reviewed.     Consultant(s) Notes Reviewed:  [x] YES  [ ] NO    Care Discussed with [x] Consultants  [x] Patient  [ ] Family  [ ]      [ ] Other; RN  DVT ppx: lovenox

## 2017-11-26 NOTE — PROGRESS NOTE ADULT - PROBLEM SELECTOR PLAN 1
-sepsis due to facial cellulitis and abscess  -now POD #1 s/p I&D of the nasal abscess; ENT following and packing the wound; c/w frequent hydrogen peroxide to the packing as per ENT recs  -BCx negative, Abscess culture growing Staph aureus as expected given rapid worsening and abscess formation  -will c/w vanco, and discontinue zosyn   -f/up sensitivities of the abscess culture, hopefully can switch to oral agent as pt otherwise much improved and stable for discharge; if not would have to place PICC line.  -leukocytosis not significantly improved, but pt had surgical procedure and symptoms much improved -- monitor WBC

## 2017-11-26 NOTE — PROGRESS NOTE ADULT - ASSESSMENT
63yo M w/ PMH of HTN, BPH, DJD, chronic neck pain presents with 3 days of nasal swelling and pain and 1 day of fever a/w sepsis due to facial cellulitis with abscess, now POD #1 s/p I&D.

## 2017-11-26 NOTE — CONSULT NOTE ADULT - SUBJECTIVE AND OBJECTIVE BOX
62 male  HLD c/o nasal pain , swelling 3-4 days    CT - rim enhancing tip 1 cm nasal collection  R/B/A of I /D dwpt     nose anesthetized with Lidocaine, left marginal incision placed, about 1.5 cc of pus evacuated   cavity irrigated , packed with iodoform strip.    POD 1 - patient reports improvement, erythema localizing to the tip, packing replaced, cavity irrigated with H2O2.  WBC 17 from 19    A/P Nasal tip abscess, s/p I/D     keep packing moist with H2O2  head elevation  Cont Abx- presumptive MRSA- culyure- pending  will F/U

## 2017-11-27 LAB
-  AMPICILLIN/SULBACTAM: SIGNIFICANT CHANGE UP
-  CEFAZOLIN: SIGNIFICANT CHANGE UP
-  CIPROFLOXACIN: SIGNIFICANT CHANGE UP
-  CLINDAMYCIN: SIGNIFICANT CHANGE UP
-  DAPTOMYCIN: SIGNIFICANT CHANGE UP
-  ERYTHROMYCIN: SIGNIFICANT CHANGE UP
-  GENTAMICIN: SIGNIFICANT CHANGE UP
-  LEVOFLOXACIN: SIGNIFICANT CHANGE UP
-  LINEZOLID: SIGNIFICANT CHANGE UP
-  MOXIFLOXACIN(AEROBIC): SIGNIFICANT CHANGE UP
-  OXACILLIN: SIGNIFICANT CHANGE UP
-  PENICILLIN: SIGNIFICANT CHANGE UP
-  RIFAMPIN: SIGNIFICANT CHANGE UP
-  TETRACYCLINE: SIGNIFICANT CHANGE UP
-  TRIMETHOPRIM/SULFAMETHOXAZOLE: SIGNIFICANT CHANGE UP
-  VANCOMYCIN: SIGNIFICANT CHANGE UP
ANION GAP SERPL CALC-SCNC: 7 MMOL/L — SIGNIFICANT CHANGE UP (ref 5–17)
BUN SERPL-MCNC: 15 MG/DL — SIGNIFICANT CHANGE UP (ref 7–23)
CALCIUM SERPL-MCNC: 8.9 MG/DL — SIGNIFICANT CHANGE UP (ref 8.5–10.1)
CHLORIDE SERPL-SCNC: 106 MMOL/L — SIGNIFICANT CHANGE UP (ref 96–108)
CO2 SERPL-SCNC: 29 MMOL/L — SIGNIFICANT CHANGE UP (ref 22–31)
CREAT SERPL-MCNC: 1 MG/DL — SIGNIFICANT CHANGE UP (ref 0.5–1.3)
GLUCOSE SERPL-MCNC: 104 MG/DL — HIGH (ref 70–99)
HCT VFR BLD CALC: 42 % — SIGNIFICANT CHANGE UP (ref 39–50)
HGB BLD-MCNC: 13.6 G/DL — SIGNIFICANT CHANGE UP (ref 13–17)
MCHC RBC-ENTMCNC: 31.2 PG — SIGNIFICANT CHANGE UP (ref 27–34)
MCHC RBC-ENTMCNC: 32.3 GM/DL — SIGNIFICANT CHANGE UP (ref 32–36)
MCV RBC AUTO: 96.5 FL — SIGNIFICANT CHANGE UP (ref 80–100)
METHOD TYPE: SIGNIFICANT CHANGE UP
PLATELET # BLD AUTO: 324 K/UL — SIGNIFICANT CHANGE UP (ref 150–400)
POTASSIUM SERPL-MCNC: 3.6 MMOL/L — SIGNIFICANT CHANGE UP (ref 3.5–5.3)
POTASSIUM SERPL-SCNC: 3.6 MMOL/L — SIGNIFICANT CHANGE UP (ref 3.5–5.3)
RBC # BLD: 4.35 M/UL — SIGNIFICANT CHANGE UP (ref 4.2–5.8)
RBC # FLD: 11.9 % — SIGNIFICANT CHANGE UP (ref 10.3–14.5)
SODIUM SERPL-SCNC: 142 MMOL/L — SIGNIFICANT CHANGE UP (ref 135–145)
VANCOMYCIN TROUGH SERPL-MCNC: 13.7 UG/ML — SIGNIFICANT CHANGE UP (ref 10–20)
WBC # BLD: 13.6 K/UL — HIGH (ref 3.8–10.5)
WBC # FLD AUTO: 13.6 K/UL — HIGH (ref 3.8–10.5)

## 2017-11-27 PROCEDURE — 99233 SBSQ HOSP IP/OBS HIGH 50: CPT

## 2017-11-27 RX ADMIN — HYDROGEN PEROXIDE 1 APPLICATION(S): 0.3 LIQUID TOPICAL at 11:00

## 2017-11-27 RX ADMIN — Medication 1 TABLET(S): at 08:56

## 2017-11-27 RX ADMIN — Medication 100 MILLIGRAM(S): at 17:41

## 2017-11-27 RX ADMIN — HYDROGEN PEROXIDE 1 APPLICATION(S): 0.3 LIQUID TOPICAL at 18:00

## 2017-11-27 RX ADMIN — HYDROGEN PEROXIDE 1 APPLICATION(S): 0.3 LIQUID TOPICAL at 22:34

## 2017-11-27 RX ADMIN — Medication 166.67 MILLIGRAM(S): at 22:30

## 2017-11-27 RX ADMIN — Medication 1 TABLET(S): at 17:41

## 2017-11-27 RX ADMIN — HYDROGEN PEROXIDE 1 APPLICATION(S): 0.3 LIQUID TOPICAL at 12:00

## 2017-11-27 RX ADMIN — HYDROGEN PEROXIDE 1 APPLICATION(S): 0.3 LIQUID TOPICAL at 09:16

## 2017-11-27 RX ADMIN — Medication 166.67 MILLIGRAM(S): at 10:00

## 2017-11-27 RX ADMIN — HYDROGEN PEROXIDE 1 APPLICATION(S): 0.3 LIQUID TOPICAL at 14:00

## 2017-11-27 RX ADMIN — HYDROGEN PEROXIDE 1 APPLICATION(S): 0.3 LIQUID TOPICAL at 16:00

## 2017-11-27 RX ADMIN — Medication 81 MILLIGRAM(S): at 11:44

## 2017-11-27 RX ADMIN — Medication 100 MILLIGRAM(S): at 05:48

## 2017-11-27 RX ADMIN — HYDROGEN PEROXIDE 1 APPLICATION(S): 0.3 LIQUID TOPICAL at 23:23

## 2017-11-27 RX ADMIN — HYDROGEN PEROXIDE 1 APPLICATION(S): 0.3 LIQUID TOPICAL at 21:27

## 2017-11-27 RX ADMIN — HYDROGEN PEROXIDE 1 APPLICATION(S): 0.3 LIQUID TOPICAL at 07:00

## 2017-11-27 NOTE — PROGRESS NOTE ADULT - ASSESSMENT
63yo M w/ PMH of HTN, BPH, DJD, chronic neck pain presents with 3 days of nasal swelling and pain and 1 day of fever a/w sepsis due to facial cellulitis with abscess, now POD #2 s/p I&D.

## 2017-11-27 NOTE — PROGRESS NOTE ADULT - SUBJECTIVE AND OBJECTIVE BOX
Patient is a 62y old  Male who presents with a chief complaint of fever, facial pain/swelling (25 Nov 2017 01:25)    INTERVAL HPI/OVERNIGHT EVENTS: pt states fever has resolved and pain in his nose and jenae-nasal area greatly improved after drainage of abscess. No CP, SOB, diarrhea.     MEDICATIONS  (STANDING):  amLODIPine   Tablet 5 milliGRAM(s) Oral daily  aspirin enteric coated 81 milliGRAM(s) Oral daily  docusate sodium 100 milliGRAM(s) Oral two times a day  enoxaparin Injectable 40 milliGRAM(s) SubCutaneous every 24 hours  hydrochlorothiazide 12.5 milliGRAM(s) Oral daily  lactobacillus acidophilus 1 Tablet(s) Oral two times a day with meals  losartan 50 milliGRAM(s) Oral daily  mupirocin 2% Ointment 1 Application(s) Topical four times a day  vancomycin  IVPB 1250 milliGRAM(s) IV Intermittent every 12 hours    MEDICATIONS  (PRN):  acetaminophen   Tablet 650 milliGRAM(s) Oral every 6 hours PRN For Temp greater than 38 C (100.4 F)  acetaminophen   Tablet. 650 milliGRAM(s) Oral every 6 hours PRN Moderate Pain (4 - 6)  hydrogen peroxide 3% Solution 1 Application(s) Topical <User Schedule> PRN Right Nasal Abscess  oxyCODONE    5 mG/acetaminophen 325 mG 1 Tablet(s) Oral every 6 hours PRN Severe Pain (7 - 10)        Allergies    No Known Allergies    Intolerances        REVIEW OF SYSTEMS:  CONSTITUTIONAL: No fever or chills  HEENT:  pain in the nose greatly improved after drainage of abscess, redness localized to tip now  RESPIRATORY: No cough, wheezing, or shortness of breath  CARDIOVASCULAR: No chest pain, palpitations, or leg swelling  GASTROINTESTINAL: No abd pain, nausea, vomiting, or diarrhea  GENITOURINARY: No dysuria, frequency, or hematuria  NEUROLOGICAL: no focal weakness or dizziness  MUSCULOSKELETAL: no myalgias     Vital Signs Last 24 Hrs  T(C): 36.9 (27 Nov 2017 20:35), Max: 36.9 (27 Nov 2017 20:35)  T(F): 98.4 (27 Nov 2017 20:35), Max: 98.4 (27 Nov 2017 20:35)  HR: 68 (27 Nov 2017 20:35) (62 - 68)  BP: 106/71 (27 Nov 2017 20:35) (97/63 - 108/68)  BP(mean): --  RR: 17 (27 Nov 2017 20:35) (16 - 17)  SpO2: 96% (27 Nov 2017 20:35) (95% - 96%)    PHYSICAL EXAM:  GENERAL: NAD  HEENT:  EOMI, moist mucous membranes  CHEST/LUNG:  CTA b/l, no rales, wheezes, or rhonchi  HEART:  RRR, S1, S2  ABDOMEN:  BS+, soft, nontender, nondistended  EXTREMITIES: no edema, cyanosis, or calf tenderness  NERVOUS SYSTEM: AA&Ox3    LABS:                                   13.6   13.6  )-----------( 324      ( 27 Nov 2017 07:39 )             42.0     CBC Full  -  ( 27 Nov 2017 07:39 )  WBC Count : 13.6 K/uL  Hemoglobin : 13.6 g/dL  Hematocrit : 42.0 %  Platelet Count - Automated : 324 K/uL  Mean Cell Volume : 96.5 fl  Mean Cell Hemoglobin : 31.2 pg  Mean Cell Hemoglobin Concentration : 32.3 gm/dL  Auto Neutrophil # : x  Auto Lymphocyte # : x  Auto Monocyte # : x  Auto Eosinophil # : x  Auto Basophil # : x  Auto Neutrophil % : x  Auto Lymphocyte % : x  Auto Monocyte % : x  Auto Eosinophil % : x  Auto Basophil % : x    11-27    142  |  106  |  15  ----------------------------<  104<H>  3.6   |  29  |  1.00    Ca    8.9      27 Nov 2017 07:39        CAPILLARY BLOOD GLUCOSE            Culture - Abscess with Gram Stain (collected 11-25-17 @ 23:04)  Source: .Abscess Right Nostril  Preliminary Report (11-26-17 @ 17:30):    Few Staphylococcus aureus    Culture - Blood (collected 11-25-17 @ 10:03)  Source: .Blood Blood  Preliminary Report (11-26-17 @ 11:00):    No growth to date.    Culture - Blood (collected 11-25-17 @ 10:03)  Source: .Blood Blood  Preliminary Report (11-26-17 @ 11:00):    No growth to date.        RADIOLOGY & ADDITIONAL TESTS:  CT Maxillofacial: Approximately 1.0 cm abscess in the subcutaneous tissues at the tip of the nose. Surrounding swelling compatible with cellulitis.    Personally reviewed.     Consultant(s) Notes Reviewed:  [x] YES  [ ] NO    Care Discussed with [x] Consultants  [x] Patient  [x] Family  [ ]      [ ] Other; RN  DVT ppx: lovenox Patient is a 62y old  Male who presents with a chief complaint of fever, facial pain/swelling (25 Nov 2017 01:25)    INTERVAL HPI/OVERNIGHT EVENTS: pt states fever has resolved and pain in his nose and jenae-nasal area greatly improved after drainage of abscess. No CP, SOB, diarrhea.     MEDICATIONS  (STANDING):  amLODIPine   Tablet 5 milliGRAM(s) Oral daily  aspirin enteric coated 81 milliGRAM(s) Oral daily  docusate sodium 100 milliGRAM(s) Oral two times a day  enoxaparin Injectable 40 milliGRAM(s) SubCutaneous every 24 hours  hydrochlorothiazide 12.5 milliGRAM(s) Oral daily  lactobacillus acidophilus 1 Tablet(s) Oral two times a day with meals  losartan 50 milliGRAM(s) Oral daily  mupirocin 2% Ointment 1 Application(s) Topical four times a day  vancomycin  IVPB 1250 milliGRAM(s) IV Intermittent every 12 hours    MEDICATIONS  (PRN):  acetaminophen   Tablet 650 milliGRAM(s) Oral every 6 hours PRN For Temp greater than 38 C (100.4 F)  acetaminophen   Tablet. 650 milliGRAM(s) Oral every 6 hours PRN Moderate Pain (4 - 6)  hydrogen peroxide 3% Solution 1 Application(s) Topical <User Schedule> PRN Right Nasal Abscess  oxyCODONE    5 mG/acetaminophen 325 mG 1 Tablet(s) Oral every 6 hours PRN Severe Pain (7 - 10)      Allergies    No Known Allergies    Intolerances      REVIEW OF SYSTEMS:  CONSTITUTIONAL: No fever or chills  HEENT:  pain in the nose greatly improved after drainage of abscess, redness localized to tip now  RESPIRATORY: No cough, wheezing, or shortness of breath  CARDIOVASCULAR: No chest pain, palpitations, or leg swelling  GASTROINTESTINAL: No abd pain, nausea, vomiting, or diarrhea  GENITOURINARY: No dysuria, frequency, or hematuria  NEUROLOGICAL: no focal weakness or dizziness  MUSCULOSKELETAL: no myalgias     Vital Signs Last 24 Hrs  T(C): 36.9 (27 Nov 2017 20:35), Max: 36.9 (27 Nov 2017 20:35)  T(F): 98.4 (27 Nov 2017 20:35), Max: 98.4 (27 Nov 2017 20:35)  HR: 68 (27 Nov 2017 20:35) (62 - 68)  BP: 106/71 (27 Nov 2017 20:35) (97/63 - 108/68)  BP(mean): --  RR: 17 (27 Nov 2017 20:35) (16 - 17)  SpO2: 96% (27 Nov 2017 20:35) (95% - 96%)    PHYSICAL EXAM:  GENERAL: NAD  HEENT:  EOMI, moist mucous membranes  CHEST/LUNG:  CTA b/l, no rales, wheezes, or rhonchi  HEART:  RRR, S1, S2  ABDOMEN:  BS+, soft, nontender, nondistended  EXTREMITIES: no edema, cyanosis, or calf tenderness  NERVOUS SYSTEM: AA&Ox3    LABS:                                   13.6   13.6  )-----------( 324      ( 27 Nov 2017 07:39 )             42.0     CBC Full  -  ( 27 Nov 2017 07:39 )  WBC Count : 13.6 K/uL  Hemoglobin : 13.6 g/dL  Hematocrit : 42.0 %  Platelet Count - Automated : 324 K/uL  Mean Cell Volume : 96.5 fl  Mean Cell Hemoglobin : 31.2 pg  Mean Cell Hemoglobin Concentration : 32.3 gm/dL  Auto Neutrophil # : x  Auto Lymphocyte # : x  Auto Monocyte # : x  Auto Eosinophil # : x  Auto Basophil # : x  Auto Neutrophil % : x  Auto Lymphocyte % : x  Auto Monocyte % : x  Auto Eosinophil % : x  Auto Basophil % : x    11-27    142  |  106  |  15  ----------------------------<  104<H>  3.6   |  29  |  1.00    Ca    8.9      27 Nov 2017 07:39        CAPILLARY BLOOD GLUCOSE            Culture - Abscess with Gram Stain (collected 11-25-17 @ 23:04)  Source: .Abscess Right Nostril  Preliminary Report (11-26-17 @ 17:30):    Few Staphylococcus aureus    Culture - Blood (collected 11-25-17 @ 10:03)  Source: .Blood Blood  Preliminary Report (11-26-17 @ 11:00):    No growth to date.    Culture - Blood (collected 11-25-17 @ 10:03)  Source: .Blood Blood  Preliminary Report (11-26-17 @ 11:00):    No growth to date.        RADIOLOGY & ADDITIONAL TESTS:  CT Maxillofacial: Approximately 1.0 cm abscess in the subcutaneous tissues at the tip of the nose. Surrounding swelling compatible with cellulitis.    Personally reviewed.     Consultant(s) Notes Reviewed:  [x] YES  [ ] NO    Care Discussed with [x] Consultants  [x] Patient  [x] Family  [ ]      [ ] Other; RN  DVT ppx: lovenox

## 2017-11-27 NOTE — PROGRESS NOTE ADULT - PROBLEM SELECTOR PLAN 1
-sepsis due to facial cellulitis and abscess  -now POD #2 s/p I&D of the nasal abscess; ENT following and packing the wound; c/w frequent hydrogen peroxide to the packing as per ENT recs  -BCx negative, Abscess culture growing Staph aureus as expected given rapid worsening and abscess formation  -will c/w vanco pending organism sensitivities  -f/up sensitivities of the abscess culture, hopefully can switch to oral agent as pt otherwise much improved and stable for discharge; if only IV options available, will have to place PICC line.  -symptoms and leukocytosis improving

## 2017-11-27 NOTE — PROGRESS NOTE ADULT - PROBLEM SELECTOR PLAN 5
-pt is an active smoker and is considering quitting. Offered nicotine patch, pt preferred to keep using his Nicorette gum that he has at bedside.   -tobacco cessation counseling provided for 16 minutes describing options / resources -- pt will consider it and f/up with his PMD.

## 2017-11-27 NOTE — PROGRESS NOTE ADULT - PROBLEM SELECTOR PLAN 4
-c/w percocet PRN severe pain (has been on Vicodin in the past, checked iSTOP - last picked up in Sept 5th, 2017) for degenerative joint disease

## 2017-11-28 ENCOUNTER — TRANSCRIPTION ENCOUNTER (OUTPATIENT)
Age: 62
End: 2017-11-28

## 2017-11-28 VITALS
OXYGEN SATURATION: 97 % | DIASTOLIC BLOOD PRESSURE: 59 MMHG | RESPIRATION RATE: 16 BRPM | SYSTOLIC BLOOD PRESSURE: 99 MMHG | TEMPERATURE: 98 F | HEART RATE: 61 BPM

## 2017-11-28 DIAGNOSIS — Z72.0 TOBACCO USE: ICD-10-CM

## 2017-11-28 LAB
-  AMPICILLIN/SULBACTAM: SIGNIFICANT CHANGE UP
-  CEFAZOLIN: SIGNIFICANT CHANGE UP
-  CIPROFLOXACIN: SIGNIFICANT CHANGE UP
-  CLINDAMYCIN: SIGNIFICANT CHANGE UP
-  DAPTOMYCIN: SIGNIFICANT CHANGE UP
-  ERYTHROMYCIN: SIGNIFICANT CHANGE UP
-  GENTAMICIN: SIGNIFICANT CHANGE UP
-  LEVOFLOXACIN: SIGNIFICANT CHANGE UP
-  LINEZOLID: SIGNIFICANT CHANGE UP
-  MOXIFLOXACIN(AEROBIC): SIGNIFICANT CHANGE UP
-  OXACILLIN: SIGNIFICANT CHANGE UP
-  PENICILLIN: SIGNIFICANT CHANGE UP
-  RIFAMPIN: SIGNIFICANT CHANGE UP
-  TETRACYCLINE: SIGNIFICANT CHANGE UP
-  TRIMETHOPRIM/SULFAMETHOXAZOLE: SIGNIFICANT CHANGE UP
-  VANCOMYCIN: SIGNIFICANT CHANGE UP
HCT VFR BLD CALC: 39.6 % — SIGNIFICANT CHANGE UP (ref 39–50)
HGB BLD-MCNC: 12.9 G/DL — LOW (ref 13–17)
MCHC RBC-ENTMCNC: 31.4 PG — SIGNIFICANT CHANGE UP (ref 27–34)
MCHC RBC-ENTMCNC: 32.5 GM/DL — SIGNIFICANT CHANGE UP (ref 32–36)
MCV RBC AUTO: 96.5 FL — SIGNIFICANT CHANGE UP (ref 80–100)
METHOD TYPE: SIGNIFICANT CHANGE UP
PLATELET # BLD AUTO: 342 K/UL — SIGNIFICANT CHANGE UP (ref 150–400)
RBC # BLD: 4.1 M/UL — LOW (ref 4.2–5.8)
RBC # FLD: 12.1 % — SIGNIFICANT CHANGE UP (ref 10.3–14.5)
WBC # BLD: 11.8 K/UL — HIGH (ref 3.8–10.5)
WBC # FLD AUTO: 11.8 K/UL — HIGH (ref 3.8–10.5)

## 2017-11-28 PROCEDURE — 96375 TX/PRO/DX INJ NEW DRUG ADDON: CPT

## 2017-11-28 PROCEDURE — 87040 BLOOD CULTURE FOR BACTERIA: CPT

## 2017-11-28 PROCEDURE — 85027 COMPLETE CBC AUTOMATED: CPT

## 2017-11-28 PROCEDURE — 99285 EMERGENCY DEPT VISIT HI MDM: CPT | Mod: 25

## 2017-11-28 PROCEDURE — 80048 BASIC METABOLIC PNL TOTAL CA: CPT

## 2017-11-28 PROCEDURE — 99239 HOSP IP/OBS DSCHRG MGMT >30: CPT

## 2017-11-28 PROCEDURE — 83605 ASSAY OF LACTIC ACID: CPT

## 2017-11-28 PROCEDURE — 80202 ASSAY OF VANCOMYCIN: CPT

## 2017-11-28 PROCEDURE — 87070 CULTURE OTHR SPECIMN AEROBIC: CPT

## 2017-11-28 PROCEDURE — 85610 PROTHROMBIN TIME: CPT

## 2017-11-28 PROCEDURE — 80053 COMPREHEN METABOLIC PANEL: CPT

## 2017-11-28 PROCEDURE — 96374 THER/PROPH/DIAG INJ IV PUSH: CPT

## 2017-11-28 PROCEDURE — 36415 COLL VENOUS BLD VENIPUNCTURE: CPT

## 2017-11-28 PROCEDURE — 85730 THROMBOPLASTIN TIME PARTIAL: CPT

## 2017-11-28 PROCEDURE — 87186 SC STD MICRODIL/AGAR DIL: CPT

## 2017-11-28 PROCEDURE — 71045 X-RAY EXAM CHEST 1 VIEW: CPT

## 2017-11-28 PROCEDURE — 93005 ELECTROCARDIOGRAM TRACING: CPT

## 2017-11-28 PROCEDURE — 87205 SMEAR GRAM STAIN: CPT

## 2017-11-28 PROCEDURE — 70487 CT MAXILLOFACIAL W/DYE: CPT

## 2017-11-28 RX ORDER — HYDROGEN PEROXIDE 0.3 KG/100L
1 LIQUID TOPICAL
Qty: 0 | Refills: 0 | DISCHARGE
Start: 2017-11-28

## 2017-11-28 RX ORDER — MUPIROCIN 20 MG/G
1 OINTMENT TOPICAL
Qty: 0 | Refills: 0 | COMMUNITY
Start: 2017-11-28

## 2017-11-28 RX ORDER — MUPIROCIN 20 MG/G
1 OINTMENT TOPICAL
Qty: 1 | Refills: 0
Start: 2017-11-28

## 2017-11-28 RX ADMIN — Medication 1 TABLET(S): at 08:29

## 2017-11-28 RX ADMIN — Medication 166.67 MILLIGRAM(S): at 10:33

## 2017-11-28 RX ADMIN — HYDROGEN PEROXIDE 1 APPLICATION(S): 0.3 LIQUID TOPICAL at 10:36

## 2017-11-28 RX ADMIN — Medication 100 MILLIGRAM(S): at 06:00

## 2017-11-28 RX ADMIN — HYDROGEN PEROXIDE 1 APPLICATION(S): 0.3 LIQUID TOPICAL at 06:02

## 2017-11-28 NOTE — DISCHARGE NOTE ADULT - HOSPITAL COURSE
HPI: HPI:  62y M PMH HTN, BPH, DJD, chronic neck pain presents with 3 days of nasal swelling and pain.  Pt states he trimmed hose nose hairs with scissors ~6 days prior to admission.  One day later, he felt what he thought was a scab on the inside of his right nostril which he accidentally dislodged while blowing his nose.  Pt subsequently began experiencing swelling, pain, and pressure at the tip of his nose accompanied by mild temporal headache and marked fatigue.  Pt tried treating the wound at home with neosporin and warm compresses.  On day of presentation, pt began to feel chills and marked fatigue so came to ED.  Pt denies any changes in vision, smell, sinus pressure or mouth pain.  He is able to tolerate PO. Denied chest pain, palpitations, SOB, cough, abdominal pain, nausea, vomiting, diarrhea, constipation, dizziness, numbness, tingling.    Hospital Course:  In the ED, Vitals on presentation: T100.7F oral, , /75, RR 16, SpO2 99% on RA.  Labs significant for WBC 19.1, remainder of CBC, CMP WNL.  Lactate 1.7.  Blood cultures collected.  CT maxillofacial showed: Approximately 1.0 cm abscess in the subcutaneous tissues at the tip of the nose. Surrounding swelling compatible with cellulitis.    Pt received mupirocin ointment, put on vanco/zosyn.  Pt admitted with sepsis due to facial cellulitis and abscess. ENT, Dr. Colin, performed a bedside I&D on the abscess and sent a culture and packed the wound. BCx negative. Abscess culture grew MRSA, which was sensitive to Bactrim. Until culture was back, pt was treated with IV vanco and his cellulitis virtually resolved; leukocytosis nearly resolved, VS stable afebrile. Symptoms improved greatly after the I&D. Pt instructed by Dr. Colin on dressing changes, keeping dressing moist with hydrogen peroxide. Pt discharged home with Bactrim and will f/up with ENT tomorrow.     On day of discharge:  ROS: nose pain virtually resolved, just slight pain near incision; No fever, chills, SOB, diarrhea  VS: BP: 99/59; HR: 61; T: 98.2; RR: 16; 97% on RA  PHYSICAL EXAM:  GENERAL: NAD  HEENT:  EOMI, Slight erythema at tip of the nose, not warm, packing in place in the nose.   CHEST/LUNG:  CTA b/l, no rales, wheezes, or rhonchi  HEART:  RRR, S1, S2  ABDOMEN:  BS+, soft, nontender, nondistended  EXTREMITIES: no edema, cyanosis, or calf tenderness  NERVOUS SYSTEM: AA&Ox3    Called PMD's office and left message regarding discharge.    Time spent: 45min

## 2017-11-28 NOTE — DISCHARGE NOTE ADULT - MEDICATION SUMMARY - MEDICATIONS TO TAKE
I will START or STAY ON the medications listed below when I get home from the hospital:    Ecotrin Adult Low Strength 81 mg oral delayed release tablet  -- 1 tab(s) by mouth once a day  -- Indication: For Heart health    Vicodin ES 7.5 mg-300 mg oral tablet  -- 1 tab(s) by mouth every 6 hours, As Needed  -- Indication: For Degenerative disc disease, cervical    Benicar 20 mg oral tablet  -- 1 tab(s) by mouth once a day  -- Indication: For HTN (hypertension)    amLODIPine 5 mg oral tablet  -- 1 tab(s) by mouth once a day  -- Indication: For HTN (hypertension)    mupirocin 2% topical ointment  -- 1 application on skin 4 times a day to the nose  -- Indication: For Nasal cellulitis / abscess    hydrogen peroxide 3% topical solution  -- 1 application topically to the into nose packing to keep it moist.  -- Indication: For Nasal cellulitis / abscess    hydroCHLOROthiazide 12.5 mg oral tablet  -- 1 tab(s) by mouth once a day  -- Indication: For HTN (hypertension)    Bactrim  mg-160 mg oral tablet  -- 1 tab(s) by mouth 2 times a day for 4 more days (through 12/02/17)  -- Avoid prolonged or excessive exposure to direct and/or artificial sunlight while taking this medication.  Finish all this medication unless otherwise directed by prescriber.  Medication should be taken with plenty of water.    -- Indication: For Nasal cellulitis / abscess

## 2017-11-28 NOTE — DISCHARGE NOTE ADULT - PLAN OF CARE
resolution You had an infection with MRSA bacteria that is sensitive to Bactrim. Take the prescribed antibiotic - Bactrim - twice daily (first dose this evening) for four more days (through 12/02/17. Use the Mupirocin cream to apply to the nose tip 3-4 times per day.   Keep the packing moist with hydrogen peroxide.   Follow up with ENT, Dr. Colin (phone number below), in the office in 1-2 days as he instructed you. Follow up with your PMD. Continue your home regimen and follow up with your PMD. Continue your home medication as needed for pain. Follow up with PMD or orthopedist per routine. It is highly recommended to quit smoking. If you need any support, follow up with your PMD.

## 2017-11-28 NOTE — DISCHARGE NOTE ADULT - CARE PROVIDER_API CALL
Clifford Colin), Otolaryngology  [address] [place] [address]  Phone: [phone]  Fax: [phone]

## 2017-11-28 NOTE — DISCHARGE NOTE ADULT - PATIENT PORTAL LINK FT
“You can access the Follow[employer] Patient Portal, offered by [facility], by registering with the following website: [email or web address]”

## 2017-11-28 NOTE — DISCHARGE NOTE ADULT - CARE PLAN
Principal Discharge DX:	Cellulitis and abscess  Goal:	resolution  Instructions for follow-up, activity and diet:	You had an infection with MRSA bacteria that is sensitive to Bactrim. Take the prescribed antibiotic - Bactrim - twice daily (first dose this evening) for four more days (through 12/02/17. Use the Mupirocin cream to apply to the nose tip 3-4 times per day.   Keep the packing moist with hydrogen peroxide.   Follow up with ENT, Dr. Colin (phone number below), in the office in 1-2 days as he instructed you. Follow up with your PMD.  Secondary Diagnosis:	HTN (hypertension)  Instructions for follow-up, activity and diet:	Continue your home regimen and follow up with your PMD.  Secondary Diagnosis:	Degenerative disc disease, cervical  Instructions for follow-up, activity and diet:	Continue your home medication as needed for pain. Follow up with PMD or orthopedist per routine.  Secondary Diagnosis:	Tobacco abuse  Instructions for follow-up, activity and diet:	It is highly recommended to quit smoking. If you need any support, follow up with your PMD.

## 2017-11-30 LAB
CULTURE RESULTS: SIGNIFICANT CHANGE UP
ORGANISM # SPEC MICROSCOPIC CNT: SIGNIFICANT CHANGE UP
ORGANISM # SPEC MICROSCOPIC CNT: SIGNIFICANT CHANGE UP
SPECIMEN SOURCE: SIGNIFICANT CHANGE UP

## 2019-05-21 NOTE — PROGRESS NOTE ADULT - PROBLEM SELECTOR PLAN 2
Same Day Surgery Discharge Instructions  Special Precautions After Surgery - Adult    1. It is not unusual to feel lightheaded or faint, up to 24 hours after surgery or while taking pain medication.  If you have these symptoms; sit for a few minutes before standing and have someone assist you when getting up.  2. You should rest and relax for the next 24 hours and must have someone stay with you for at least 24 hours after your discharge.  3. DO NOT DRIVE any vehicle or operate mechanical equipment for 24 hours following the end of your surgery.  DO NOT DRIVE while taking narcotic pain medications that have been prescribed by your physician.  If you had a limb operated on, you must be able to use it fully to drive.  4. DO NOT drink alcoholic beverages for 24 hours following surgery or while taking prescription pain medication.  5. Drink clear liquids (apple juice, ginger ale, broth, 7-Up, etc.).  Progress to your regular diet as you feel able.  6. Any questions call your physician and do not make important decisions for 24 hours.       __________________________________________________________________________________________________________________________________  IMPORTANT NUMBERS:    [facility] [place]:  [phone], [phone]  Pharmacy:  [phone]  Same Day Surgery:  [phone], Monday - Friday until 8:30 p.m.  Urgent Care:  [phone]  Emergency Room:  [phone]                                                                                  [facility]:  [phone] option 1 -  Dr. Watson              -c/w home anti-hypertensive regimen with hold parameters Private Residence

## 2021-06-09 NOTE — ED ADULT NURSE NOTE - PAIN: PRESENCE, MLM
complains of pain/discomfort Bilobed Flap Text: The defect edges were debeveled with a #15 scalpel blade.  Given the location of the defect and the proximity to free margins a bilobe flap was deemed most appropriate.  Using a sterile surgical marker, an appropriate bilobe flap drawn around the defect.    The area thus outlined was incised deep to adipose tissue with a #15 scalpel blade.  The skin margins were undermined to an appropriate distance in all directions utilizing iris scissors.

## 2022-07-06 PROBLEM — I10 ESSENTIAL (PRIMARY) HYPERTENSION: Chronic | Status: ACTIVE | Noted: 2017-11-24

## 2022-07-06 PROBLEM — M50.30 OTHER CERVICAL DISC DEGENERATION, UNSPECIFIED CERVICAL REGION: Chronic | Status: ACTIVE | Noted: 2017-11-25

## 2022-07-06 PROBLEM — M54.2 CERVICALGIA: Chronic | Status: ACTIVE | Noted: 2017-11-25

## 2022-07-06 PROBLEM — N40.0 BENIGN PROSTATIC HYPERPLASIA WITHOUT LOWER URINARY TRACT SYMPTOMS: Chronic | Status: ACTIVE | Noted: 2017-11-25

## 2022-07-07 PROBLEM — Z00.00 ENCOUNTER FOR PREVENTIVE HEALTH EXAMINATION: Status: ACTIVE | Noted: 2022-07-07

## 2022-07-08 ENCOUNTER — APPOINTMENT (OUTPATIENT)
Dept: OTOLARYNGOLOGY | Facility: CLINIC | Age: 67
End: 2022-07-08

## 2022-07-08 VITALS
HEART RATE: 85 BPM | DIASTOLIC BLOOD PRESSURE: 82 MMHG | SYSTOLIC BLOOD PRESSURE: 138 MMHG | HEIGHT: 64 IN | BODY MASS INDEX: 27.31 KG/M2 | WEIGHT: 160 LBS

## 2022-07-08 PROCEDURE — 99203 OFFICE O/P NEW LOW 30 MIN: CPT | Mod: 25

## 2022-07-08 PROCEDURE — 69210 REMOVE IMPACTED EAR WAX UNI: CPT

## 2022-07-08 RX ORDER — NEOMYCIN AND POLYMYXIN B SULFATES AND HYDROCORTISONE OTIC 10; 3.5; 1 MG/ML; MG/ML; [USP'U]/ML
3.5-10000-1 SUSPENSION AURICULAR (OTIC) 4 TIMES DAILY
Qty: 1 | Refills: 2 | Status: ACTIVE | COMMUNITY
Start: 2022-07-08 | End: 1900-01-01

## 2022-07-08 NOTE — REVIEW OF SYSTEMS
[Ear Pain] : ear pain [Ear Itch] : ear itch [Hearing Loss] : hearing loss [Ear Drainage] : ear drainage [Discharge From Eyes] : purulent discharge from the eyes [Eyes Itch] : itching of the eyes [Itching] : itching [Negative] : Heme/Lymph [Patient Intake Form Reviewed] : Patient intake form was reviewed [FreeTextEntry2] : fatigue [de-identified] : rash

## 2022-07-08 NOTE — REASON FOR VISIT
[Initial Consultation] : an initial consultation for [Ear Drainage] : ear drainage [Ear Pain] : ear pain [FreeTextEntry2] : ear cleaning

## 2022-07-08 NOTE — PHYSICAL EXAM
[de-identified] : JENNIFER IMPACTED CERUMEN / DEBRIS REMOVED/ CANALS IRRITATED [Normal] : mucosa is normal [Midline] : trachea located in midline position

## 2022-07-18 ENCOUNTER — APPOINTMENT (OUTPATIENT)
Dept: OTOLARYNGOLOGY | Facility: CLINIC | Age: 67
End: 2022-07-18

## 2022-09-21 ENCOUNTER — APPOINTMENT (OUTPATIENT)
Dept: OTOLARYNGOLOGY | Facility: CLINIC | Age: 67
End: 2022-09-21

## 2022-09-21 VITALS
SYSTOLIC BLOOD PRESSURE: 155 MMHG | BODY MASS INDEX: 27.31 KG/M2 | WEIGHT: 160 LBS | HEIGHT: 64 IN | DIASTOLIC BLOOD PRESSURE: 77 MMHG | HEART RATE: 78 BPM

## 2022-09-21 DIAGNOSIS — H60.90 UNSPECIFIED OTITIS EXTERNA, UNSPECIFIED EAR: ICD-10-CM

## 2022-09-21 DIAGNOSIS — H61.20 IMPACTED CERUMEN, UNSPECIFIED EAR: ICD-10-CM

## 2022-09-21 PROCEDURE — 69210 REMOVE IMPACTED EAR WAX UNI: CPT

## 2022-09-21 PROCEDURE — 99214 OFFICE O/P EST MOD 30 MIN: CPT | Mod: 25

## 2022-09-21 RX ORDER — CIPROFLOXACIN AND DEXAMETHASONE 3; 1 MG/ML; MG/ML
0.3-0.1 SUSPENSION/ DROPS AURICULAR (OTIC)
Qty: 1 | Refills: 2 | Status: ACTIVE | COMMUNITY
Start: 2022-09-21 | End: 1900-01-01

## 2022-09-21 NOTE — HISTORY OF PRESENT ILLNESS
[de-identified] : EARS FEEL CLOGGED\par CESARY SYNDROME\par UNDER CARE OF ONCOLOGY\par MEDICAL HX REVIEWED

## 2022-09-21 NOTE — PHYSICAL EXAM
[de-identified] : JENNIFER IMPACTED CERUMEN / DEBRIS REMOVED/ CANALS IRRITATED ON THE LEFT [Normal] : mucosa is normal [Midline] : trachea located in midline position

## 2022-10-21 ENCOUNTER — APPOINTMENT (OUTPATIENT)
Dept: OTOLARYNGOLOGY | Facility: CLINIC | Age: 67
End: 2022-10-21

## 2022-10-21 VITALS
HEIGHT: 64 IN | WEIGHT: 160 LBS | BODY MASS INDEX: 27.31 KG/M2 | SYSTOLIC BLOOD PRESSURE: 159 MMHG | DIASTOLIC BLOOD PRESSURE: 90 MMHG | HEART RATE: 97 BPM

## 2022-10-21 DIAGNOSIS — H91.90 UNSPECIFIED HEARING LOSS, UNSPECIFIED EAR: ICD-10-CM

## 2022-10-21 DIAGNOSIS — C85.90 NON-HODGKIN LYMPHOMA, UNSPECIFIED, UNSPECIFIED SITE: ICD-10-CM

## 2022-10-21 PROCEDURE — 99214 OFFICE O/P EST MOD 30 MIN: CPT

## 2022-10-21 PROCEDURE — 92567 TYMPANOMETRY: CPT

## 2022-10-21 PROCEDURE — 92557 COMPREHENSIVE HEARING TEST: CPT

## 2022-10-21 NOTE — ASSESSMENT
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

## 2022-10-21 NOTE — HISTORY OF PRESENT ILLNESS
[de-identified] : PROGRESSIVE HEARING LOSS OVER A YEAR OR SO\par T CELL LYMPHOMA UNDER CARE OF ONCOLOGIST

## 2022-10-21 NOTE — DATA REVIEWED
[de-identified] : Type A tymps AU\par mild to severe SNHL, 250-8000 Hz, AU with mixed component 3-4KHz AU\par REC: ENT f/u, consider AU hearing aids, consider TV ears

## 2023-08-23 NOTE — PATIENT PROFILE ADULT. - FUNCTIONAL LEVEL PRIOR: DRESSING
Azithromycin Counseling:  I discussed with the patient the risks of azithromycin including but not limited to GI upset, allergic reaction, drug rash, diarrhea, and yeast infections. (0) independent

## 2023-11-10 ENCOUNTER — INPATIENT (INPATIENT)
Facility: HOSPITAL | Age: 68
LOS: 4 days | Discharge: HOME HEALTH | DRG: 175 | End: 2023-11-15
Attending: INTERNAL MEDICINE | Admitting: STUDENT IN AN ORGANIZED HEALTH CARE EDUCATION/TRAINING PROGRAM
Payer: MEDICARE

## 2023-11-10 VITALS
DIASTOLIC BLOOD PRESSURE: 82 MMHG | HEIGHT: 64 IN | OXYGEN SATURATION: 99 % | RESPIRATION RATE: 22 BRPM | TEMPERATURE: 99 F | WEIGHT: 164.91 LBS | SYSTOLIC BLOOD PRESSURE: 119 MMHG | HEART RATE: 116 BPM

## 2023-11-10 DIAGNOSIS — Z98.890 OTHER SPECIFIED POSTPROCEDURAL STATES: Chronic | ICD-10-CM

## 2023-11-10 LAB
ALBUMIN SERPL ELPH-MCNC: 3.1 G/DL — LOW (ref 3.3–5)
ALBUMIN SERPL ELPH-MCNC: 3.1 G/DL — LOW (ref 3.3–5)
ALP SERPL-CCNC: 146 U/L — HIGH (ref 40–120)
ALP SERPL-CCNC: 146 U/L — HIGH (ref 40–120)
ALT FLD-CCNC: 21 U/L — SIGNIFICANT CHANGE UP (ref 12–78)
ALT FLD-CCNC: 21 U/L — SIGNIFICANT CHANGE UP (ref 12–78)
ANION GAP SERPL CALC-SCNC: 9 MMOL/L — SIGNIFICANT CHANGE UP (ref 5–17)
ANION GAP SERPL CALC-SCNC: 9 MMOL/L — SIGNIFICANT CHANGE UP (ref 5–17)
APTT BLD: 26.7 SEC — SIGNIFICANT CHANGE UP (ref 24.5–35.6)
APTT BLD: 26.7 SEC — SIGNIFICANT CHANGE UP (ref 24.5–35.6)
AST SERPL-CCNC: 13 U/L — LOW (ref 15–37)
AST SERPL-CCNC: 13 U/L — LOW (ref 15–37)
BASOPHILS # BLD AUTO: 0.07 K/UL — SIGNIFICANT CHANGE UP (ref 0–0.2)
BASOPHILS # BLD AUTO: 0.07 K/UL — SIGNIFICANT CHANGE UP (ref 0–0.2)
BASOPHILS NFR BLD AUTO: 0.7 % — SIGNIFICANT CHANGE UP (ref 0–2)
BASOPHILS NFR BLD AUTO: 0.7 % — SIGNIFICANT CHANGE UP (ref 0–2)
BILIRUB SERPL-MCNC: 1 MG/DL — SIGNIFICANT CHANGE UP (ref 0.2–1.2)
BILIRUB SERPL-MCNC: 1 MG/DL — SIGNIFICANT CHANGE UP (ref 0.2–1.2)
BUN SERPL-MCNC: 21 MG/DL — SIGNIFICANT CHANGE UP (ref 7–23)
BUN SERPL-MCNC: 21 MG/DL — SIGNIFICANT CHANGE UP (ref 7–23)
CALCIUM SERPL-MCNC: 8.5 MG/DL — SIGNIFICANT CHANGE UP (ref 8.5–10.1)
CALCIUM SERPL-MCNC: 8.5 MG/DL — SIGNIFICANT CHANGE UP (ref 8.5–10.1)
CHLORIDE SERPL-SCNC: 106 MMOL/L — SIGNIFICANT CHANGE UP (ref 96–108)
CHLORIDE SERPL-SCNC: 106 MMOL/L — SIGNIFICANT CHANGE UP (ref 96–108)
CK SERPL-CCNC: 73 U/L — SIGNIFICANT CHANGE UP (ref 26–308)
CK SERPL-CCNC: 73 U/L — SIGNIFICANT CHANGE UP (ref 26–308)
CO2 SERPL-SCNC: 28 MMOL/L — SIGNIFICANT CHANGE UP (ref 22–31)
CO2 SERPL-SCNC: 28 MMOL/L — SIGNIFICANT CHANGE UP (ref 22–31)
CREAT SERPL-MCNC: 1.1 MG/DL — SIGNIFICANT CHANGE UP (ref 0.5–1.3)
CREAT SERPL-MCNC: 1.1 MG/DL — SIGNIFICANT CHANGE UP (ref 0.5–1.3)
EGFR: 74 ML/MIN/1.73M2 — SIGNIFICANT CHANGE UP
EGFR: 74 ML/MIN/1.73M2 — SIGNIFICANT CHANGE UP
EOSINOPHIL # BLD AUTO: 0.05 K/UL — SIGNIFICANT CHANGE UP (ref 0–0.5)
EOSINOPHIL # BLD AUTO: 0.05 K/UL — SIGNIFICANT CHANGE UP (ref 0–0.5)
EOSINOPHIL NFR BLD AUTO: 0.5 % — SIGNIFICANT CHANGE UP (ref 0–6)
EOSINOPHIL NFR BLD AUTO: 0.5 % — SIGNIFICANT CHANGE UP (ref 0–6)
GLUCOSE SERPL-MCNC: 118 MG/DL — HIGH (ref 70–99)
GLUCOSE SERPL-MCNC: 118 MG/DL — HIGH (ref 70–99)
HCT VFR BLD CALC: 42.4 % — SIGNIFICANT CHANGE UP (ref 39–50)
HCT VFR BLD CALC: 42.4 % — SIGNIFICANT CHANGE UP (ref 39–50)
HGB BLD-MCNC: 14.3 G/DL — SIGNIFICANT CHANGE UP (ref 13–17)
HGB BLD-MCNC: 14.3 G/DL — SIGNIFICANT CHANGE UP (ref 13–17)
IMM GRANULOCYTES NFR BLD AUTO: 1.2 % — HIGH (ref 0–0.9)
IMM GRANULOCYTES NFR BLD AUTO: 1.2 % — HIGH (ref 0–0.9)
INR BLD: 1.04 RATIO — SIGNIFICANT CHANGE UP (ref 0.85–1.18)
INR BLD: 1.04 RATIO — SIGNIFICANT CHANGE UP (ref 0.85–1.18)
LACTATE SERPL-SCNC: 1.7 MMOL/L — SIGNIFICANT CHANGE UP (ref 0.7–2)
LACTATE SERPL-SCNC: 1.7 MMOL/L — SIGNIFICANT CHANGE UP (ref 0.7–2)
LYMPHOCYTES # BLD AUTO: 0.59 K/UL — LOW (ref 1–3.3)
LYMPHOCYTES # BLD AUTO: 0.59 K/UL — LOW (ref 1–3.3)
LYMPHOCYTES # BLD AUTO: 5.9 % — LOW (ref 13–44)
LYMPHOCYTES # BLD AUTO: 5.9 % — LOW (ref 13–44)
MCHC RBC-ENTMCNC: 30.4 PG — SIGNIFICANT CHANGE UP (ref 27–34)
MCHC RBC-ENTMCNC: 30.4 PG — SIGNIFICANT CHANGE UP (ref 27–34)
MCHC RBC-ENTMCNC: 33.7 GM/DL — SIGNIFICANT CHANGE UP (ref 32–36)
MCHC RBC-ENTMCNC: 33.7 GM/DL — SIGNIFICANT CHANGE UP (ref 32–36)
MCV RBC AUTO: 90 FL — SIGNIFICANT CHANGE UP (ref 80–100)
MCV RBC AUTO: 90 FL — SIGNIFICANT CHANGE UP (ref 80–100)
MONOCYTES # BLD AUTO: 0.71 K/UL — SIGNIFICANT CHANGE UP (ref 0–0.9)
MONOCYTES # BLD AUTO: 0.71 K/UL — SIGNIFICANT CHANGE UP (ref 0–0.9)
MONOCYTES NFR BLD AUTO: 7.1 % — SIGNIFICANT CHANGE UP (ref 2–14)
MONOCYTES NFR BLD AUTO: 7.1 % — SIGNIFICANT CHANGE UP (ref 2–14)
NEUTROPHILS # BLD AUTO: 8.49 K/UL — HIGH (ref 1.8–7.4)
NEUTROPHILS # BLD AUTO: 8.49 K/UL — HIGH (ref 1.8–7.4)
NEUTROPHILS NFR BLD AUTO: 84.6 % — HIGH (ref 43–77)
NEUTROPHILS NFR BLD AUTO: 84.6 % — HIGH (ref 43–77)
NRBC # BLD: 0 /100 WBCS — SIGNIFICANT CHANGE UP (ref 0–0)
NRBC # BLD: 0 /100 WBCS — SIGNIFICANT CHANGE UP (ref 0–0)
NT-PROBNP SERPL-SCNC: 1145 PG/ML — HIGH (ref 0–125)
NT-PROBNP SERPL-SCNC: 1145 PG/ML — HIGH (ref 0–125)
PLATELET # BLD AUTO: 300 K/UL — SIGNIFICANT CHANGE UP (ref 150–400)
PLATELET # BLD AUTO: 300 K/UL — SIGNIFICANT CHANGE UP (ref 150–400)
POTASSIUM SERPL-MCNC: 3.4 MMOL/L — LOW (ref 3.5–5.3)
POTASSIUM SERPL-MCNC: 3.4 MMOL/L — LOW (ref 3.5–5.3)
POTASSIUM SERPL-SCNC: 3.4 MMOL/L — LOW (ref 3.5–5.3)
POTASSIUM SERPL-SCNC: 3.4 MMOL/L — LOW (ref 3.5–5.3)
PROT SERPL-MCNC: 7.1 G/DL — SIGNIFICANT CHANGE UP (ref 6–8.3)
PROT SERPL-MCNC: 7.1 G/DL — SIGNIFICANT CHANGE UP (ref 6–8.3)
PROTHROM AB SERPL-ACNC: 12.2 SEC — SIGNIFICANT CHANGE UP (ref 9.5–13)
PROTHROM AB SERPL-ACNC: 12.2 SEC — SIGNIFICANT CHANGE UP (ref 9.5–13)
RBC # BLD: 4.71 M/UL — SIGNIFICANT CHANGE UP (ref 4.2–5.8)
RBC # BLD: 4.71 M/UL — SIGNIFICANT CHANGE UP (ref 4.2–5.8)
RBC # FLD: 14.3 % — SIGNIFICANT CHANGE UP (ref 10.3–14.5)
RBC # FLD: 14.3 % — SIGNIFICANT CHANGE UP (ref 10.3–14.5)
SODIUM SERPL-SCNC: 143 MMOL/L — SIGNIFICANT CHANGE UP (ref 135–145)
SODIUM SERPL-SCNC: 143 MMOL/L — SIGNIFICANT CHANGE UP (ref 135–145)
TROPONIN I, HIGH SENSITIVITY RESULT: 280.6 NG/L — HIGH
TROPONIN I, HIGH SENSITIVITY RESULT: 280.6 NG/L — HIGH
WBC # BLD: 10.03 K/UL — SIGNIFICANT CHANGE UP (ref 3.8–10.5)
WBC # BLD: 10.03 K/UL — SIGNIFICANT CHANGE UP (ref 3.8–10.5)
WBC # FLD AUTO: 10.03 K/UL — SIGNIFICANT CHANGE UP (ref 3.8–10.5)
WBC # FLD AUTO: 10.03 K/UL — SIGNIFICANT CHANGE UP (ref 3.8–10.5)

## 2023-11-10 PROCEDURE — 99291 CRITICAL CARE FIRST HOUR: CPT

## 2023-11-10 PROCEDURE — 93010 ELECTROCARDIOGRAM REPORT: CPT

## 2023-11-10 PROCEDURE — 71045 X-RAY EXAM CHEST 1 VIEW: CPT | Mod: 26

## 2023-11-10 RX ORDER — IPRATROPIUM/ALBUTEROL SULFATE 18-103MCG
3 AEROSOL WITH ADAPTER (GRAM) INHALATION ONCE
Refills: 0 | Status: COMPLETED | OUTPATIENT
Start: 2023-11-10 | End: 2023-11-10

## 2023-11-10 RX ORDER — OLMESARTAN MEDOXOMIL 5 MG/1
1 TABLET, FILM COATED ORAL
Qty: 0 | Refills: 0 | DISCHARGE

## 2023-11-10 RX ORDER — AMLODIPINE BESYLATE 2.5 MG/1
1 TABLET ORAL
Qty: 0 | Refills: 0 | DISCHARGE

## 2023-11-10 RX ORDER — HYDROCHLOROTHIAZIDE 25 MG
1 TABLET ORAL
Qty: 0 | Refills: 0 | DISCHARGE

## 2023-11-10 RX ORDER — ASPIRIN/CALCIUM CARB/MAGNESIUM 324 MG
324 TABLET ORAL ONCE
Refills: 0 | Status: COMPLETED | OUTPATIENT
Start: 2023-11-10 | End: 2023-11-10

## 2023-11-10 RX ORDER — SODIUM CHLORIDE 9 MG/ML
1000 INJECTION INTRAMUSCULAR; INTRAVENOUS; SUBCUTANEOUS ONCE
Refills: 0 | Status: COMPLETED | OUTPATIENT
Start: 2023-11-10 | End: 2023-11-10

## 2023-11-10 RX ORDER — HYDROCODONE BITARTRATE AND ACETAMINOPHEN 7.5; 325 MG/15ML; MG/15ML
1 SOLUTION ORAL
Qty: 0 | Refills: 0 | DISCHARGE

## 2023-11-10 RX ADMIN — Medication 125 MILLIGRAM(S): at 23:03

## 2023-11-10 RX ADMIN — Medication 3 MILLILITER(S): at 23:03

## 2023-11-10 RX ADMIN — SODIUM CHLORIDE 1000 MILLILITER(S): 9 INJECTION INTRAMUSCULAR; INTRAVENOUS; SUBCUTANEOUS at 23:03

## 2023-11-10 NOTE — ED PROVIDER NOTE - RESPIRATORY, MLM
Breath sounds  equal bilaterally. nl resp effort. no acc muscle use. diffuse exp wheeze bl, mod air exchange

## 2023-11-10 NOTE — ED ADULT NURSE NOTE - OBJECTIVE STATEMENT
pts wife states he has been coughing ever since last night and having difficulty breathing while laying down pt De-Sats on Room Air and is satting 93 on 4L O2.  pt states he has Tcell lymphoma  I which he was diagnosed with three years ago. pt is currently a smoke. pt was given two Duo nebs and prednisone upon arrival patient is A&Ox4. vital signs within normal limits for patient. patient denies chest pain,  medications tolerated well.  safety precautions maintained.  will continue to assess and monitor for safety.

## 2023-11-10 NOTE — ED PROVIDER NOTE - CARE PLAN
1 Principal Discharge DX:	Shortness of breath  Secondary Diagnosis:	COPD exacerbation  Secondary Diagnosis:	Elevated troponin

## 2023-11-10 NOTE — ED PROVIDER NOTE - NSICDXPASTMEDICALHX_GEN_ALL_CORE_FT
PAST MEDICAL HISTORY:  BPH (benign prostatic hyperplasia)     Chronic neck pain     Degenerative disc disease, cervical     HTN (hypertension)

## 2023-11-10 NOTE — ED PROVIDER NOTE - CHRONIC CONDITION AFFECTING CARE
Other Prednisone Pregnancy And Lactation Text: This medication is Pregnancy Category C and it isn't know if it is safe during pregnancy. This medication is excreted in breast milk.

## 2023-11-10 NOTE — ED PROVIDER NOTE - CLINICAL SUMMARY MEDICAL DECISION MAKING FREE TEXT BOX
Acute shortness of breath on and off over past 24 hours, with tight wheezing breath sounds.  Will check labs, x-ray, nebs/steroids, likely admission

## 2023-11-10 NOTE — ED PROVIDER NOTE - PROGRESS NOTE DETAILS
Patient became increasingly short of breath when taken off the oxygen, was put on 4 L nasal cannula still satting in the high 90s. Patient doing well, O2 sat 90 to 94% on 4 L.  Patient not complaining of shortness of breath at this time.  We will continue to monitor.  Discussed with patient at length regarding nature of his illness, importance of admission to the hospital for further treatment.  Discussed elevated troponin as well. Discussed with Dr. Youngblood, will see patient to admit.  Repeat EKG with no significant changes

## 2023-11-10 NOTE — ED PROVIDER NOTE - OBJECTIVE STATEMENT
68-year-old male with a history of hypertension, COPD presents with having intermittent shortness of breath over the past 24 hours.  Patient with some wheezing.  No acute chest pain.  No abdominal pain.  No vomiting or diarrhea.  No neck pain or stiffness.  No photophobia.  No recent travel or immobilization.  No aggravating or alleviating factors otherwise noted.  No other acute injury or complaints.  Patient called 911 tonight, was placed on O2 by PD.  Patient brought in on 100% nonrebreather.  No aggravating or alleviating factors otherwise noted.  No other acute injury or complaints.

## 2023-11-10 NOTE — ED PROVIDER NOTE - NORMAL, MLM
Oncology Social Work  Case management     Referral received due to medication inconsistency - patient forgetting to take meds on scheduled regular basis.   OSW called to talk to pt.  Patient's granddgt answered and OSW talked with her.  She states that patient's daughter does live with her and granddgt checks on patient almost daily.  Discussed medication difficulties.  Patient does  Have a med box that she uses and granddgt helps re fill each week.     Discussed home care. OSW emailed granddgt contact information and will follow up with patient next week.  Natalie Morton, GLOW, CSW   casi all pertinent systems normal

## 2023-11-10 NOTE — ED PROVIDER NOTE - DIFFERENTIAL DIAGNOSIS
Differential Diagnosis Acute COPD exacerbation, flu/COVID/RSV, electrolyte abnormality, other acute pathology

## 2023-11-11 DIAGNOSIS — R09.89 OTHER SPECIFIED SYMPTOMS AND SIGNS INVOLVING THE CIRCULATORY AND RESPIRATORY SYSTEMS: ICD-10-CM

## 2023-11-11 DIAGNOSIS — R06.02 SHORTNESS OF BREATH: ICD-10-CM

## 2023-11-11 LAB
A1C WITH ESTIMATED AVERAGE GLUCOSE RESULT: 6.1 % — HIGH (ref 4–5.6)
A1C WITH ESTIMATED AVERAGE GLUCOSE RESULT: 6.1 % — HIGH (ref 4–5.6)
ALBUMIN SERPL ELPH-MCNC: 3.3 G/DL — SIGNIFICANT CHANGE UP (ref 3.3–5)
ALBUMIN SERPL ELPH-MCNC: 3.3 G/DL — SIGNIFICANT CHANGE UP (ref 3.3–5)
ALP SERPL-CCNC: 151 U/L — HIGH (ref 40–120)
ALP SERPL-CCNC: 151 U/L — HIGH (ref 40–120)
ALT FLD-CCNC: 21 U/L — SIGNIFICANT CHANGE UP (ref 12–78)
ALT FLD-CCNC: 21 U/L — SIGNIFICANT CHANGE UP (ref 12–78)
ANION GAP SERPL CALC-SCNC: 9 MMOL/L — SIGNIFICANT CHANGE UP (ref 5–17)
ANION GAP SERPL CALC-SCNC: 9 MMOL/L — SIGNIFICANT CHANGE UP (ref 5–17)
APPEARANCE UR: ABNORMAL
APPEARANCE UR: ABNORMAL
APTT BLD: 54.4 SEC — HIGH (ref 24.5–35.6)
APTT BLD: 54.4 SEC — HIGH (ref 24.5–35.6)
APTT BLD: 76.9 SEC — HIGH (ref 24.5–35.6)
APTT BLD: 76.9 SEC — HIGH (ref 24.5–35.6)
AST SERPL-CCNC: 16 U/L — SIGNIFICANT CHANGE UP (ref 15–37)
AST SERPL-CCNC: 16 U/L — SIGNIFICANT CHANGE UP (ref 15–37)
BACTERIA # UR AUTO: ABNORMAL /HPF
BACTERIA # UR AUTO: ABNORMAL /HPF
BASE EXCESS BLDV CALC-SCNC: 4.2 MMOL/L — HIGH (ref -2–3)
BASE EXCESS BLDV CALC-SCNC: 4.2 MMOL/L — HIGH (ref -2–3)
BASOPHILS # BLD AUTO: 0.04 K/UL — SIGNIFICANT CHANGE UP (ref 0–0.2)
BASOPHILS # BLD AUTO: 0.04 K/UL — SIGNIFICANT CHANGE UP (ref 0–0.2)
BASOPHILS NFR BLD AUTO: 0.5 % — SIGNIFICANT CHANGE UP (ref 0–2)
BASOPHILS NFR BLD AUTO: 0.5 % — SIGNIFICANT CHANGE UP (ref 0–2)
BILIRUB SERPL-MCNC: 1.2 MG/DL — SIGNIFICANT CHANGE UP (ref 0.2–1.2)
BILIRUB SERPL-MCNC: 1.2 MG/DL — SIGNIFICANT CHANGE UP (ref 0.2–1.2)
BILIRUB UR-MCNC: NEGATIVE — SIGNIFICANT CHANGE UP
BILIRUB UR-MCNC: NEGATIVE — SIGNIFICANT CHANGE UP
BUN SERPL-MCNC: 16 MG/DL — SIGNIFICANT CHANGE UP (ref 7–23)
BUN SERPL-MCNC: 16 MG/DL — SIGNIFICANT CHANGE UP (ref 7–23)
CALCIUM SERPL-MCNC: 9 MG/DL — SIGNIFICANT CHANGE UP (ref 8.5–10.1)
CALCIUM SERPL-MCNC: 9 MG/DL — SIGNIFICANT CHANGE UP (ref 8.5–10.1)
CHLORIDE SERPL-SCNC: 106 MMOL/L — SIGNIFICANT CHANGE UP (ref 96–108)
CHLORIDE SERPL-SCNC: 106 MMOL/L — SIGNIFICANT CHANGE UP (ref 96–108)
CHOLEST SERPL-MCNC: 294 MG/DL — HIGH
CHOLEST SERPL-MCNC: 294 MG/DL — HIGH
CK MB BLD-MCNC: 4.6 % — HIGH (ref 0–3.5)
CK MB BLD-MCNC: 4.6 % — HIGH (ref 0–3.5)
CK MB BLD-MCNC: 4.7 % — HIGH (ref 0–3.5)
CK MB BLD-MCNC: 4.7 % — HIGH (ref 0–3.5)
CK MB CFR SERPL CALC: 3.6 NG/ML — SIGNIFICANT CHANGE UP (ref 0–3.6)
CK MB CFR SERPL CALC: 3.6 NG/ML — SIGNIFICANT CHANGE UP (ref 0–3.6)
CK MB CFR SERPL CALC: 4.6 NG/ML — HIGH (ref 0–3.6)
CK MB CFR SERPL CALC: 4.6 NG/ML — HIGH (ref 0–3.6)
CK SERPL-CCNC: 79 U/L — SIGNIFICANT CHANGE UP (ref 26–308)
CK SERPL-CCNC: 79 U/L — SIGNIFICANT CHANGE UP (ref 26–308)
CK SERPL-CCNC: 97 U/L — SIGNIFICANT CHANGE UP (ref 26–308)
CK SERPL-CCNC: 97 U/L — SIGNIFICANT CHANGE UP (ref 26–308)
CO2 SERPL-SCNC: 27 MMOL/L — SIGNIFICANT CHANGE UP (ref 22–31)
CO2 SERPL-SCNC: 27 MMOL/L — SIGNIFICANT CHANGE UP (ref 22–31)
COLOR SPEC: YELLOW — SIGNIFICANT CHANGE UP
COLOR SPEC: YELLOW — SIGNIFICANT CHANGE UP
COMMENT - URINE: SIGNIFICANT CHANGE UP
COMMENT - URINE: SIGNIFICANT CHANGE UP
CREAT SERPL-MCNC: 1.1 MG/DL — SIGNIFICANT CHANGE UP (ref 0.5–1.3)
CREAT SERPL-MCNC: 1.1 MG/DL — SIGNIFICANT CHANGE UP (ref 0.5–1.3)
CULTURE RESULTS: SIGNIFICANT CHANGE UP
CULTURE RESULTS: SIGNIFICANT CHANGE UP
D DIMER BLD IA.RAPID-MCNC: 287 NG/ML DDU — HIGH
D DIMER BLD IA.RAPID-MCNC: 287 NG/ML DDU — HIGH
DIFF PNL FLD: ABNORMAL
DIFF PNL FLD: ABNORMAL
EGFR: 74 ML/MIN/1.73M2 — SIGNIFICANT CHANGE UP
EGFR: 74 ML/MIN/1.73M2 — SIGNIFICANT CHANGE UP
EOSINOPHIL # BLD AUTO: 0 K/UL — SIGNIFICANT CHANGE UP (ref 0–0.5)
EOSINOPHIL # BLD AUTO: 0 K/UL — SIGNIFICANT CHANGE UP (ref 0–0.5)
EOSINOPHIL NFR BLD AUTO: 0 % — SIGNIFICANT CHANGE UP (ref 0–6)
EOSINOPHIL NFR BLD AUTO: 0 % — SIGNIFICANT CHANGE UP (ref 0–6)
EPI CELLS # UR: PRESENT
EPI CELLS # UR: PRESENT
ESTIMATED AVERAGE GLUCOSE: 128 MG/DL — HIGH (ref 68–114)
ESTIMATED AVERAGE GLUCOSE: 128 MG/DL — HIGH (ref 68–114)
FLUAV AG NPH QL: SIGNIFICANT CHANGE UP
FLUAV AG NPH QL: SIGNIFICANT CHANGE UP
FLUBV AG NPH QL: SIGNIFICANT CHANGE UP
FLUBV AG NPH QL: SIGNIFICANT CHANGE UP
GLUCOSE SERPL-MCNC: 200 MG/DL — HIGH (ref 70–99)
GLUCOSE SERPL-MCNC: 200 MG/DL — HIGH (ref 70–99)
GLUCOSE UR QL: NEGATIVE MG/DL — SIGNIFICANT CHANGE UP
GLUCOSE UR QL: NEGATIVE MG/DL — SIGNIFICANT CHANGE UP
GRAM STN FLD: ABNORMAL
HCO3 BLDV-SCNC: 30 MMOL/L — HIGH (ref 22–29)
HCO3 BLDV-SCNC: 30 MMOL/L — HIGH (ref 22–29)
HCT VFR BLD CALC: 46.4 % — SIGNIFICANT CHANGE UP (ref 39–50)
HCT VFR BLD CALC: 46.4 % — SIGNIFICANT CHANGE UP (ref 39–50)
HDLC SERPL-MCNC: 37 MG/DL — LOW
HDLC SERPL-MCNC: 37 MG/DL — LOW
HGB BLD-MCNC: 15 G/DL — SIGNIFICANT CHANGE UP (ref 13–17)
HGB BLD-MCNC: 15 G/DL — SIGNIFICANT CHANGE UP (ref 13–17)
HYALINE CASTS # UR AUTO: PRESENT
HYALINE CASTS # UR AUTO: PRESENT
IMM GRANULOCYTES NFR BLD AUTO: 1 % — HIGH (ref 0–0.9)
IMM GRANULOCYTES NFR BLD AUTO: 1 % — HIGH (ref 0–0.9)
KETONES UR-MCNC: ABNORMAL MG/DL
KETONES UR-MCNC: ABNORMAL MG/DL
LEUKOCYTE ESTERASE UR-ACNC: NEGATIVE — SIGNIFICANT CHANGE UP
LEUKOCYTE ESTERASE UR-ACNC: NEGATIVE — SIGNIFICANT CHANGE UP
LIPID PNL WITH DIRECT LDL SERPL: 232 MG/DL — HIGH
LIPID PNL WITH DIRECT LDL SERPL: 232 MG/DL — HIGH
LYMPHOCYTES # BLD AUTO: 0.58 K/UL — LOW (ref 1–3.3)
LYMPHOCYTES # BLD AUTO: 0.58 K/UL — LOW (ref 1–3.3)
LYMPHOCYTES # BLD AUTO: 7 % — LOW (ref 13–44)
LYMPHOCYTES # BLD AUTO: 7 % — LOW (ref 13–44)
MCHC RBC-ENTMCNC: 29.5 PG — SIGNIFICANT CHANGE UP (ref 27–34)
MCHC RBC-ENTMCNC: 29.5 PG — SIGNIFICANT CHANGE UP (ref 27–34)
MCHC RBC-ENTMCNC: 32.3 GM/DL — SIGNIFICANT CHANGE UP (ref 32–36)
MCHC RBC-ENTMCNC: 32.3 GM/DL — SIGNIFICANT CHANGE UP (ref 32–36)
MCV RBC AUTO: 91.2 FL — SIGNIFICANT CHANGE UP (ref 80–100)
MCV RBC AUTO: 91.2 FL — SIGNIFICANT CHANGE UP (ref 80–100)
METHOD TYPE: SIGNIFICANT CHANGE UP
METHOD TYPE: SIGNIFICANT CHANGE UP
MONOCYTES # BLD AUTO: 0.11 K/UL — SIGNIFICANT CHANGE UP (ref 0–0.9)
MONOCYTES # BLD AUTO: 0.11 K/UL — SIGNIFICANT CHANGE UP (ref 0–0.9)
MONOCYTES NFR BLD AUTO: 1.3 % — LOW (ref 2–14)
MONOCYTES NFR BLD AUTO: 1.3 % — LOW (ref 2–14)
MSSA DNA SPEC QL NAA+PROBE: SIGNIFICANT CHANGE UP
MSSA DNA SPEC QL NAA+PROBE: SIGNIFICANT CHANGE UP
NEUTROPHILS # BLD AUTO: 7.5 K/UL — HIGH (ref 1.8–7.4)
NEUTROPHILS # BLD AUTO: 7.5 K/UL — HIGH (ref 1.8–7.4)
NEUTROPHILS NFR BLD AUTO: 90.2 % — HIGH (ref 43–77)
NEUTROPHILS NFR BLD AUTO: 90.2 % — HIGH (ref 43–77)
NITRITE UR-MCNC: NEGATIVE — SIGNIFICANT CHANGE UP
NITRITE UR-MCNC: NEGATIVE — SIGNIFICANT CHANGE UP
NON HDL CHOLESTEROL: 257 MG/DL — HIGH
NON HDL CHOLESTEROL: 257 MG/DL — HIGH
NRBC # BLD: 0 /100 WBCS — SIGNIFICANT CHANGE UP (ref 0–0)
NRBC # BLD: 0 /100 WBCS — SIGNIFICANT CHANGE UP (ref 0–0)
PCO2 BLDV: 54 MMHG — SIGNIFICANT CHANGE UP (ref 42–55)
PCO2 BLDV: 54 MMHG — SIGNIFICANT CHANGE UP (ref 42–55)
PH BLDV: 7.35 — SIGNIFICANT CHANGE UP (ref 7.32–7.43)
PH BLDV: 7.35 — SIGNIFICANT CHANGE UP (ref 7.32–7.43)
PH UR: 5 — SIGNIFICANT CHANGE UP (ref 5–8)
PH UR: 5 — SIGNIFICANT CHANGE UP (ref 5–8)
PLATELET # BLD AUTO: 338 K/UL — SIGNIFICANT CHANGE UP (ref 150–400)
PLATELET # BLD AUTO: 338 K/UL — SIGNIFICANT CHANGE UP (ref 150–400)
PO2 BLDV: 35 MMHG — SIGNIFICANT CHANGE UP (ref 25–45)
PO2 BLDV: 35 MMHG — SIGNIFICANT CHANGE UP (ref 25–45)
POTASSIUM SERPL-MCNC: 3.7 MMOL/L — SIGNIFICANT CHANGE UP (ref 3.5–5.3)
POTASSIUM SERPL-MCNC: 3.7 MMOL/L — SIGNIFICANT CHANGE UP (ref 3.5–5.3)
POTASSIUM SERPL-SCNC: 3.7 MMOL/L — SIGNIFICANT CHANGE UP (ref 3.5–5.3)
POTASSIUM SERPL-SCNC: 3.7 MMOL/L — SIGNIFICANT CHANGE UP (ref 3.5–5.3)
PROT SERPL-MCNC: 7.6 G/DL — SIGNIFICANT CHANGE UP (ref 6–8.3)
PROT SERPL-MCNC: 7.6 G/DL — SIGNIFICANT CHANGE UP (ref 6–8.3)
PROT UR-MCNC: 30 MG/DL
PROT UR-MCNC: 30 MG/DL
RBC # BLD: 5.09 M/UL — SIGNIFICANT CHANGE UP (ref 4.2–5.8)
RBC # BLD: 5.09 M/UL — SIGNIFICANT CHANGE UP (ref 4.2–5.8)
RBC # FLD: 14.3 % — SIGNIFICANT CHANGE UP (ref 10.3–14.5)
RBC # FLD: 14.3 % — SIGNIFICANT CHANGE UP (ref 10.3–14.5)
RBC CASTS # UR COMP ASSIST: 4 /HPF — SIGNIFICANT CHANGE UP (ref 0–4)
RBC CASTS # UR COMP ASSIST: 4 /HPF — SIGNIFICANT CHANGE UP (ref 0–4)
RSV RNA NPH QL NAA+NON-PROBE: SIGNIFICANT CHANGE UP
RSV RNA NPH QL NAA+NON-PROBE: SIGNIFICANT CHANGE UP
SAO2 % BLDV: 50 % — LOW (ref 67–88)
SAO2 % BLDV: 50 % — LOW (ref 67–88)
SARS-COV-2 RNA SPEC QL NAA+PROBE: SIGNIFICANT CHANGE UP
SARS-COV-2 RNA SPEC QL NAA+PROBE: SIGNIFICANT CHANGE UP
SODIUM SERPL-SCNC: 142 MMOL/L — SIGNIFICANT CHANGE UP (ref 135–145)
SODIUM SERPL-SCNC: 142 MMOL/L — SIGNIFICANT CHANGE UP (ref 135–145)
SP GR SPEC: 1.03 — SIGNIFICANT CHANGE UP (ref 1–1.03)
SP GR SPEC: 1.03 — SIGNIFICANT CHANGE UP (ref 1–1.03)
SPECIMEN SOURCE: SIGNIFICANT CHANGE UP
TRIGL SERPL-MCNC: 137 MG/DL — SIGNIFICANT CHANGE UP
TRIGL SERPL-MCNC: 137 MG/DL — SIGNIFICANT CHANGE UP
TROPONIN I, HIGH SENSITIVITY RESULT: 789 NG/L — HIGH
TROPONIN I, HIGH SENSITIVITY RESULT: 789 NG/L — HIGH
TROPONIN I, HIGH SENSITIVITY RESULT: 947.5 NG/L — HIGH
TROPONIN I, HIGH SENSITIVITY RESULT: 947.5 NG/L — HIGH
UROBILINOGEN FLD QL: 1 MG/DL — SIGNIFICANT CHANGE UP (ref 0.2–1)
UROBILINOGEN FLD QL: 1 MG/DL — SIGNIFICANT CHANGE UP (ref 0.2–1)
WBC # BLD: 8.31 K/UL — SIGNIFICANT CHANGE UP (ref 3.8–10.5)
WBC # BLD: 8.31 K/UL — SIGNIFICANT CHANGE UP (ref 3.8–10.5)
WBC # FLD AUTO: 8.31 K/UL — SIGNIFICANT CHANGE UP (ref 3.8–10.5)
WBC # FLD AUTO: 8.31 K/UL — SIGNIFICANT CHANGE UP (ref 3.8–10.5)
WBC UR QL: 2 /HPF — SIGNIFICANT CHANGE UP (ref 0–5)
WBC UR QL: 2 /HPF — SIGNIFICANT CHANGE UP (ref 0–5)

## 2023-11-11 PROCEDURE — 93970 EXTREMITY STUDY: CPT | Mod: 26

## 2023-11-11 PROCEDURE — 93010 ELECTROCARDIOGRAM REPORT: CPT

## 2023-11-11 PROCEDURE — 99223 1ST HOSP IP/OBS HIGH 75: CPT | Mod: GC

## 2023-11-11 PROCEDURE — 71275 CT ANGIOGRAPHY CHEST: CPT | Mod: 26

## 2023-11-11 RX ORDER — ENOXAPARIN SODIUM 100 MG/ML
40 INJECTION SUBCUTANEOUS EVERY 24 HOURS
Refills: 0 | Status: DISCONTINUED | OUTPATIENT
Start: 2023-11-11 | End: 2023-11-11

## 2023-11-11 RX ORDER — HYDROMORPHONE HYDROCHLORIDE 2 MG/ML
0 INJECTION INTRAMUSCULAR; INTRAVENOUS; SUBCUTANEOUS
Qty: 0 | Refills: 0 | DISCHARGE

## 2023-11-11 RX ORDER — CEFAZOLIN SODIUM 1 G
2000 VIAL (EA) INJECTION ONCE
Refills: 0 | Status: COMPLETED | OUTPATIENT
Start: 2023-11-11 | End: 2023-11-11

## 2023-11-11 RX ORDER — INFLUENZA VIRUS VACCINE 15; 15; 15; 15 UG/.5ML; UG/.5ML; UG/.5ML; UG/.5ML
0.7 SUSPENSION INTRAMUSCULAR ONCE
Refills: 0 | Status: DISCONTINUED | OUTPATIENT
Start: 2023-11-11 | End: 2023-11-15

## 2023-11-11 RX ORDER — CEFAZOLIN SODIUM 1 G
VIAL (EA) INJECTION
Refills: 0 | Status: DISCONTINUED | OUTPATIENT
Start: 2023-11-11 | End: 2023-11-15

## 2023-11-11 RX ORDER — HEPARIN SODIUM 5000 [USP'U]/ML
3000 INJECTION INTRAVENOUS; SUBCUTANEOUS EVERY 6 HOURS
Refills: 0 | Status: DISCONTINUED | OUTPATIENT
Start: 2023-11-11 | End: 2023-11-15

## 2023-11-11 RX ORDER — NICOTINE POLACRILEX 2 MG
4 GUM BUCCAL EVERY 6 HOURS
Refills: 0 | Status: DISCONTINUED | OUTPATIENT
Start: 2023-11-11 | End: 2023-11-15

## 2023-11-11 RX ORDER — HEPARIN SODIUM 5000 [USP'U]/ML
6000 INJECTION INTRAVENOUS; SUBCUTANEOUS ONCE
Refills: 0 | Status: COMPLETED | OUTPATIENT
Start: 2023-11-11 | End: 2023-11-11

## 2023-11-11 RX ORDER — AMLODIPINE BESYLATE 2.5 MG/1
0 TABLET ORAL
Qty: 0 | Refills: 0 | DISCHARGE

## 2023-11-11 RX ORDER — LOSARTAN POTASSIUM 100 MG/1
100 TABLET, FILM COATED ORAL DAILY
Refills: 0 | Status: DISCONTINUED | OUTPATIENT
Start: 2023-11-11 | End: 2023-11-11

## 2023-11-11 RX ORDER — HYDROMORPHONE HYDROCHLORIDE 2 MG/ML
4 INJECTION INTRAMUSCULAR; INTRAVENOUS; SUBCUTANEOUS
Refills: 0 | Status: DISCONTINUED | OUTPATIENT
Start: 2023-11-11 | End: 2023-11-15

## 2023-11-11 RX ORDER — IPRATROPIUM/ALBUTEROL SULFATE 18-103MCG
3 AEROSOL WITH ADAPTER (GRAM) INHALATION EVERY 6 HOURS
Refills: 0 | Status: DISCONTINUED | OUTPATIENT
Start: 2023-11-11 | End: 2023-11-15

## 2023-11-11 RX ORDER — HYDROCORTISONE 1 %
1 OINTMENT (GRAM) TOPICAL DAILY
Refills: 0 | Status: DISCONTINUED | OUTPATIENT
Start: 2023-11-11 | End: 2023-11-11

## 2023-11-11 RX ORDER — HEPARIN SODIUM 5000 [USP'U]/ML
INJECTION INTRAVENOUS; SUBCUTANEOUS
Qty: 25000 | Refills: 0 | Status: DISCONTINUED | OUTPATIENT
Start: 2023-11-11 | End: 2023-11-15

## 2023-11-11 RX ORDER — ASPIRIN/CALCIUM CARB/MAGNESIUM 324 MG
81 TABLET ORAL DAILY
Refills: 0 | Status: DISCONTINUED | OUTPATIENT
Start: 2023-11-11 | End: 2023-11-15

## 2023-11-11 RX ORDER — ALPRAZOLAM 0.25 MG
0 TABLET ORAL
Qty: 0 | Refills: 0 | DISCHARGE

## 2023-11-11 RX ORDER — LANOLIN ALCOHOL/MO/W.PET/CERES
3 CREAM (GRAM) TOPICAL AT BEDTIME
Refills: 0 | Status: DISCONTINUED | OUTPATIENT
Start: 2023-11-11 | End: 2023-11-15

## 2023-11-11 RX ORDER — HYDROCORTISONE 1 %
1 OINTMENT (GRAM) TOPICAL DAILY
Refills: 0 | Status: DISCONTINUED | OUTPATIENT
Start: 2023-11-11 | End: 2023-11-15

## 2023-11-11 RX ORDER — HEPARIN SODIUM 5000 [USP'U]/ML
6000 INJECTION INTRAVENOUS; SUBCUTANEOUS EVERY 6 HOURS
Refills: 0 | Status: DISCONTINUED | OUTPATIENT
Start: 2023-11-11 | End: 2023-11-15

## 2023-11-11 RX ORDER — CEFAZOLIN SODIUM 1 G
2000 VIAL (EA) INJECTION EVERY 8 HOURS
Refills: 0 | Status: DISCONTINUED | OUTPATIENT
Start: 2023-11-11 | End: 2023-11-15

## 2023-11-11 RX ORDER — AMLODIPINE BESYLATE 2.5 MG/1
5 TABLET ORAL DAILY
Refills: 0 | Status: DISCONTINUED | OUTPATIENT
Start: 2023-11-11 | End: 2023-11-11

## 2023-11-11 RX ORDER — HYDROCORTISONE 1 %
1 OINTMENT (GRAM) TOPICAL
Refills: 0 | DISCHARGE

## 2023-11-11 RX ORDER — ONDANSETRON 8 MG/1
4 TABLET, FILM COATED ORAL EVERY 8 HOURS
Refills: 0 | Status: DISCONTINUED | OUTPATIENT
Start: 2023-11-11 | End: 2023-11-15

## 2023-11-11 RX ORDER — ALPRAZOLAM 0.25 MG
0.25 TABLET ORAL AT BEDTIME
Refills: 0 | Status: DISCONTINUED | OUTPATIENT
Start: 2023-11-11 | End: 2023-11-15

## 2023-11-11 RX ORDER — ASPIRIN/CALCIUM CARB/MAGNESIUM 324 MG
1 TABLET ORAL
Qty: 0 | Refills: 0 | DISCHARGE

## 2023-11-11 RX ORDER — ALBUTEROL 90 UG/1
2.5 AEROSOL, METERED ORAL
Refills: 0 | Status: DISCONTINUED | OUTPATIENT
Start: 2023-11-11 | End: 2023-11-15

## 2023-11-11 RX ORDER — ACETAMINOPHEN 500 MG
650 TABLET ORAL EVERY 6 HOURS
Refills: 0 | Status: DISCONTINUED | OUTPATIENT
Start: 2023-11-11 | End: 2023-11-15

## 2023-11-11 RX ADMIN — Medication 3 MILLILITER(S): at 20:56

## 2023-11-11 RX ADMIN — HYDROMORPHONE HYDROCHLORIDE 4 MILLIGRAM(S): 2 INJECTION INTRAMUSCULAR; INTRAVENOUS; SUBCUTANEOUS at 09:39

## 2023-11-11 RX ADMIN — Medication 20 MILLIGRAM(S): at 05:15

## 2023-11-11 RX ADMIN — Medication 100 MILLIGRAM(S): at 17:25

## 2023-11-11 RX ADMIN — Medication 4 MILLIGRAM(S): at 17:25

## 2023-11-11 RX ADMIN — HYDROMORPHONE HYDROCHLORIDE 4 MILLIGRAM(S): 2 INJECTION INTRAMUSCULAR; INTRAVENOUS; SUBCUTANEOUS at 10:39

## 2023-11-11 RX ADMIN — HEPARIN SODIUM 6000 UNIT(S): 5000 INJECTION INTRAVENOUS; SUBCUTANEOUS at 06:09

## 2023-11-11 RX ADMIN — HEPARIN SODIUM 1500 UNIT(S)/HR: 5000 INJECTION INTRAVENOUS; SUBCUTANEOUS at 22:34

## 2023-11-11 RX ADMIN — Medication 100 MILLIGRAM(S): at 21:31

## 2023-11-11 RX ADMIN — ENOXAPARIN SODIUM 40 MILLIGRAM(S): 100 INJECTION SUBCUTANEOUS at 05:15

## 2023-11-11 RX ADMIN — Medication 81 MILLIGRAM(S): at 11:51

## 2023-11-11 RX ADMIN — Medication 324 MILLIGRAM(S): at 00:30

## 2023-11-11 RX ADMIN — HEPARIN SODIUM 3000 UNIT(S): 5000 INJECTION INTRAVENOUS; SUBCUTANEOUS at 22:34

## 2023-11-11 RX ADMIN — HEPARIN SODIUM 1300 UNIT(S)/HR: 5000 INJECTION INTRAVENOUS; SUBCUTANEOUS at 07:20

## 2023-11-11 RX ADMIN — Medication 3 MILLILITER(S): at 02:31

## 2023-11-11 RX ADMIN — HEPARIN SODIUM 1300 UNIT(S)/HR: 5000 INJECTION INTRAVENOUS; SUBCUTANEOUS at 06:09

## 2023-11-11 RX ADMIN — Medication 4 MILLIGRAM(S): at 11:51

## 2023-11-11 RX ADMIN — HEPARIN SODIUM 1300 UNIT(S)/HR: 5000 INJECTION INTRAVENOUS; SUBCUTANEOUS at 19:08

## 2023-11-11 RX ADMIN — Medication 3 MILLILITER(S): at 08:11

## 2023-11-11 RX ADMIN — HEPARIN SODIUM 1300 UNIT(S)/HR: 5000 INJECTION INTRAVENOUS; SUBCUTANEOUS at 13:39

## 2023-11-11 RX ADMIN — Medication 3 MILLILITER(S): at 14:17

## 2023-11-11 NOTE — CONSULT NOTE ADULT - SUBJECTIVE AND OBJECTIVE BOX
History of Present Illness: The patient is a 68 year old male with a history of HTN, COPD, cutaneous T cell lymphoma who presents with shortness of breath. He had noted worsening dyspnea on exertion and wheezing over the last few days. No leg swelling recently (although states he did have leg swelling months ago when was more immobile from malignancy). No chest pain.    Past Medical/Surgical History:  HTN, COPD, cutaneous T cell lymphoma    Medications:  Home Medications:  ALPRAZOLAM 0.25 MG TABLET: TAKE 1 TABLET BY MOUTH NIGHTLY AS NEEDED FOR SLEEP. MAX DAILY AMOUNT: 0.25 MG. (11 Nov 2023 01:43)  AMLODIPINE BESYLATE 5 MG TAB: TAKE 1 TABLET BY MOUTH EVERY DAY IN THE MORNING (11 Nov 2023 01:43)  Brentuxiab: Infusion every 3 weeks (11 Nov 2023 01:40)  Ecotrin Adult Low Strength 81 mg oral delayed release tablet: 1 tab(s) orally once a day (11 Nov 2023 01:43)  hydrocortisone 2.5% topical lotion: Apply topically to affected area as needed for skin irritation (11 Nov 2023 01:40)  HYDROMORPHONE 4 MG TABLET: TAKE 1 TABLET BY MOUTH EVERY 3 HOURS AS NEEDED FOR MODERATE PAIN (4-6) OR SEVERE PAIN (7-10) (11 Nov 2023 01:43)  LOSARTAN-HCTZ 100-12.5 MG TAB: TAKE 1 TABLET BY MOUTH EVERY DAY IN THE MORNING (11 Nov 2023 01:43)  PREDNISONE 20 MG TABLET: TAKE 1 TABLET BY MOUTH EVERY DAY (11 Nov 2023 01:43)  triamcinolone 0.1% topical lotion: Apply topically to affected area as needed for skin irriation (11 Nov 2023 01:43)      Family History: Non-contributory family history of premature cardiovascular atherosclerotic disease    Social History: +tobacco use    Review of Systems:  General: No fevers, chills, weight gain  Skin: No rashes, color changes  Cardiovascular: No chest pain, orthopnea  Respiratory: +shortness of breath, cough  Gastrointestinal: No nausea, abdominal pain  Genitourinary: No incontinence, pain with urination  Musculoskeletal: No pain, swelling, decreased range of motion  Neurological: No headache, weakness  Psychiatric: No depression, anxiety  Endocrine: No weight gain, increased thirst  All other systems are comprehensively negative.    Physical Exam:  Vitals:        Vital Signs Last 24 Hrs  T(C): 36.3 (11 Nov 2023 00:44), Max: 37.2 (10 Nov 2023 22:26)  T(F): 97.4 (11 Nov 2023 00:44), Max: 99 (10 Nov 2023 22:26)  HR: 89 (11 Nov 2023 08:20) (89 - 116)  BP: 111/77 (11 Nov 2023 00:44) (111/77 - 119/82)  BP(mean): --  RR: 19 (11 Nov 2023 00:44) (19 - 22)  SpO2: 93% (11 Nov 2023 08:20) (92% - 99%)  Parameters below as of 11 Nov 2023 08:20  Patient On (Oxygen Delivery Method): nasal cannula, 4L  General: NAD  HEENT: MMM  Neck: No JVD, no carotid bruit  Lungs: CTAB  CV: RRR, nl S1/S2, no M/R/G  Abdomen: S/NT/ND, +BS  Extremities: No LE edema, no cyanosis  Neuro: AAOx3, non-focal  Skin: No rash    Labs:                        14.3   10.03 )-----------( 300      ( 10 Nov 2023 23:00 )             42.4     11-10    143  |  106  |  21  ----------------------------<  118<H>  3.4<L>   |  28  |  1.10    Ca    8.5      10 Nov 2023 23:00    TPro  7.1  /  Alb  3.1<L>  /  TBili  1.0  /  DBili  x   /  AST  13<L>  /  ALT  21  /  AlkPhos  146<H>  11-10    CARDIAC MARKERS ( 11 Nov 2023 01:32 )  x     / x     / 79 U/L / x     / 3.6 ng/mL  CARDIAC MARKERS ( 10 Nov 2023 23:00 )  x     / x     / 73 U/L / x     / x          PT/INR - ( 10 Nov 2023 23:00 )   PT: 12.2 sec;   INR: 1.04 ratio         PTT - ( 10 Nov 2023 23:00 )  PTT:26.7 sec    ECG/Telemetry: Sinus tachycardia, nonspecific ST abnormality

## 2023-11-11 NOTE — CONSULT NOTE ADULT - ASSESSMENT
68y male with PMHx of Sezary Syndrome (T-Cell Lymphoma), COPD, tobacco use, HTN, anxiety presented to the ER for shortness of breath for the past 24 hours. Pt states he has been experiencing shortness of breath when laying down for the last few days, which resolved with sitting up. However, last night pt states when laying down he became short of breath with "tightness" in his airway that persistent for 30 minutes 68y male with PMHx of Sezary Syndrome (T-Cell Lymphoma), COPD, tobacco use, HTN, anxiety presented to the ER for shortness of breath for the past 24 hours. Pt states he has been experiencing shortness of breath when laying down for the last few days, which resolved with sitting up. However, last night pt states when laying down he became short of breath with "tightness" in his airway that persistent for 30 minutes    skin ca  PE  copd  smoker  htn  OP  OA  Anxiety      echo - le doppler -   tele monitoring  pt follows with steph chen in the community - dr armstrong - [facility]  hep gtt - with eventual doac transition  bronchodilators - steroids low dose  fio2 titration  goal sat > 88 pct  RV assessment  monitor VS and HD and Sat  smoking cess ed  nicotine gum - pt request - ordered  VBG  education - counseling -   PFT as outpatient

## 2023-11-11 NOTE — H&P ADULT - ATTENDING COMMENTS
68y male with PMHx of Sezary Syndrome (T-Cell Lymphoma), COPD, tobacco use, HTN, anxiety admitted with acute hypoxic respiratory failure and elevated troponin. Admit to telemetry. Monitor for arrhythmia. Trend CE's to peak. Check 2D ECHO. Continue supplemental O2. Smoking cessation. Check CTA Chest with IV contrast given active malignancy, tachycardia and hypoxemia. Pulmonary/Cardiology consults. Discussed with patient at bedside. [facility] hospitalist to follow in AM.    Agree with H&P as outlined above, edited where appropriate.

## 2023-11-11 NOTE — H&P ADULT - NSHPOUTPATIENTPROVIDERS_GEN_ALL_CORE
PCP Natanael Baltazar ([facility])  [facility] [place] Natanael Baltazar ([facility])  Was referred to Cardiology Dr. Olea (has not seen yet)  [facility]

## 2023-11-11 NOTE — H&P ADULT - NSHPREVIEWOFSYSTEMS_GEN_ALL_CORE
As per above General: no fever, chills  Eyes: no vision changes  ENT: no hearing changes, nasal congestion, or sore throat  CV: no chest pain or palpitations; ADMITS orthopnea and MARINELLI  Pulm: no hemoptysis; ADMITS cough, SOB, wheeze  Abd/GI: no nausea, vomiting, diarrhea, constipation, abd pain  : no dysuria, hematuria, urinary frequency  MSK: no joint pain or myalgias  Neuro: no syncope, dizziness, focal weakness  Psych: ADMITS anxiety  Endo: no heat or cold intolerance

## 2023-11-11 NOTE — CONSULT NOTE ADULT - ASSESSMENT
The patient is a 68 year old male with a history of HTN, COPD, cutaneous T cell lymphoma who presents with shortness of breath in the setting of acute PE, COPD.    Plan:  - ECG with no evidence of ischemia or infarction  - Troponin elevated at 947 in the setting of acute pulmonary embolism  - BNP 1145  - CTA chest with bilateral PE  - LE venous duplex pending  - Echo pending  - Continue heparin drip and transition to apixaban when stable  - Pulm eval

## 2023-11-11 NOTE — CHART NOTE - NSCHARTNOTEFT_GEN_A_CORE
Received a call from Radiologist, Dr. Luis Felipe Saenz, who endorses multiple bilateral Pulmonary Emboli seen on CTA on preliminary read. Official report to follow.     Plan:   - Heparin gtt   - Pulmonology, Dr. Gonzalez, made aware   - Follow up official CTA report Received a call from Radiologist, Dr. Luis Felipe Saenz, who endorses multiple bilateral Pulmonary Emboli seen on CTA on preliminary read. Official report to follow.     Plan:   - Heparin gtt   - Pulmonology, Dr. Gonzalez, made aware   - Follow up official CTA report  - Lower extremity dopplers ordered to evaluate for residual clot burden

## 2023-11-11 NOTE — H&P ADULT - HISTORY OF PRESENT ILLNESS
68y male with PMHx of Sezary Syndrome (T-Cell Lymphoma), COPD, tobacco use, HTN presented to the ER for shortness of breath for the past 24 hours. 68y male with PMHx of Sezary Syndrome (T-Cell Lymphoma), COPD, tobacco use, HTN presented to the ER for shortness of breath for the past 24 hours.    Denies fever, chills, chest pain, palpitations, SOB, cough, abdominal pain, nausea, vomiting, diarrhea, constipation, urinary frequency, urgency, or dysuria, headaches, changes in vision, dizziness, numbness, tingling.  Denies recent travel, recent antibiotic use, or sick contacts.    ED Course:   Vitals: BP: 119/82, HR: 116, Temp: 99, RR: 22, SpO2: 99% on NRB 15L    Labs: WBC 10.03, K 3.4, Cr 1.10, eGFR 74, Trop 280.6, pBNP 1145   CXR: Cardiomegaly with increased pulmonary markings, hyperinflated lungs as per personal read, official read pending   EKG: Sinus tachycardia to 107 bpm   Received in the ED:  x1, Solu-Medrol 125 mg IVP, Duoneb x1, 1L NS bolus    68y male with PMHx of Sezary Syndrome (T-Cell Lymphoma), COPD, tobacco use, HTN presented to the ER for shortness of breath for the past 24 hours. Pt states he has been experiencing shortness of breath when laying down for the last few days, which resolved with sitting up. However, last night pt states when laying down he became short of breath with "tightness" in his airway that persistent for 30 minutes. Endorses persistent shortness of breath with exertion, but feels well at rest. Endorses recent cough, wheeze, fatigue, and new kin lesions. Denies pain with inhalation, but states it felt tight and was unable to move air. Pt was diagnosed with Sezary Syndrome in 2021 and is actively undergoing treatment. States his lymph nodes in his neck have become more prominent and lesions have worsened on his face, abdomen, arms, and legs. Pt endorses significant stress in his life and becomes dyspneic when speaking about it. Pt also states his lesions seem to flare when he is under stress. Pt has no other complaints at this time.     ED Course:   Vitals: BP: 119/82, HR: 116, Temp: 99, RR: 22, SpO2: 99% on NRB 15L    Labs: WBC 10.03, K 3.4, Cr 1.10, eGFR 74, Trop 280.6, pBNP 1145   CXR: Cardiomegaly with increased pulmonary markings, hyperinflated lungs as per personal read, official read pending   EKG: Sinus tachycardia to 107 bpm   Received in the ED:  x1, Solu-Medrol 125 mg IVP, Duoneb x1, 1L NS bolus    68y male with PMHx of Sezary Syndrome (T-Cell Lymphoma), COPD, tobacco use, HTN, anxiety presented to the ER for shortness of breath for the past 24 hours. Pt states he has been experiencing shortness of breath when laying down for the last few days, which resolved with sitting up. However, last night pt states when laying down he became short of breath with "tightness" in his airway that persistent for 30 minutes. Endorses persistent shortness of breath with exertion, but feels well at rest. Endorses recent cough, wheeze, fatigue, and new kin lesions. Denies pain with inhalation, but states it felt tight and was unable to move air. Pt was diagnosed with Sezary Syndrome in 2021 and is actively undergoing treatment. States his lymph nodes in his neck have become more prominent and lesions have worsened on his face, abdomen, arms, and legs. Pt endorses significant stress in his life and becomes dyspneic when speaking about it. Pt also states his lesions seem to flare when he is under stress. Due for a PET-CT with IV contrast on 11/20. SpO2 in the low 90th% on supplemental O2 via NC.    His PCP Dr. Baltazar has referred him to [facility] cardiology (patient believes it was Dr. Olea) as he wanted him to obtain an echocardiogram. Has not gone yet.    ED Course:   Vitals: BP: 119/82, HR: 116, Temp: 99, RR: 22, SpO2: 99% on NRB 15L    Labs: WBC 10.03, K 3.4, Cr 1.10, eGFR 74, Trop 280.6, pBNP 1145   CXR: Cardiomegaly with increased pulmonary markings, hyperinflated lungs as per personal read, official read pending   EKG: Sinus tachycardia to 107 bpm   Received in the ED:  x1, Solu-Medrol 125 mg IVP, Duoneb x1, 1L NS bolus

## 2023-11-11 NOTE — H&P ADULT - ASSESSMENT
68y male with PMHx of Sezary Syndrome (T-Cell Lymphoma), COPD, tobacco use, HTN presented with shortness of breath. 68y male with PMHx of Sezary Syndrome (T-Cell Lymphoma), COPD, tobacco use, HTN presented with shortness of breath.    #COPD  - Hypoxic requiring supplemental O2, now on 4L NC with saturations ranging from 92-97% with acute drops to mid 80s with exertion   - CXR: Cardiomegaly with increased pulmonary markings, hyperinflated lungs as per personal read, official read pending   - s/p Solu-Medrol 125 mg IVP, Duoneb x1, 1L NS bolus   - Wean as tolerated with goal of 88-92%   - D-Dimer 287, negative when corrected for age   - However, given SOB, tachycardia, and active malignancy with order CTA to r/o PE   - Continue Duonebs and Albuterol   - Active smoker, Nicotine patch ordered   - Pulmonology consulted, Dr. Gonzalez, follow up recommendations     #Elevated Troponin   - 260.8 -> 947.5, possibly demand   - Asymptomatic   - s/p ASA in ED   - EKG showing sinus tachycardia   - Cardiology consulted, Dr. Tony Acosta, follow up recommendations     #Elevated proBNP   - No hx of heart failure, not volume overloaded on exam   - Continue to monitor     #Sezary Syndrome   - Extensive cutaneous disease, actively undergoing infusions q3 weeks   - Pt has PET scan scheduled for 11/20  - Continue home medications     #HTN   - Normotensive on presentation   - Hold antihypertensives for now     #Need for prophylactic measures   - Lovenox 40 mg qD    68y male with PMHx of Sezary Syndrome (T-Cell Lymphoma), COPD, tobacco use, HTN, anxiety presented with shortness of breath.    #Acute hypoxic respiratory failure  #COPD  - Hypoxic requiring supplemental O2, now on 4L NC with saturations ranging from 92-97% with acute drops to mid 80s with exertion   - CXR: Cardiomegaly with increased pulmonary markings, hyperinflated lungs as per personal read, official read pending   - s/p Solu-Medrol 125 mg IVP, Duoneb x1, 1L NS bolus   - Wean as tolerated with goal of 88-92%   - D-Dimer 287, negative when corrected for age   - However, given SOB, tachycardia, and active malignancy with order CTA to r/o PE   - Continue Duonebs ATC and Albuterol PRN  - Will defer systemic steroids to pulmonary (on chronic prednisone)  - Active smoker, Nicotine patch ordered   - Pulmonology consulted, Dr. Gonzalez, follow up recommendations     #Elevated Troponin   - 260.8 -> 947.5, possibly demand - trend to peak  - Asymptomatic   - s/p ASA in ED   - EKG showing sinus tachycardia  - Check 2D ECHO  - Cardiology consulted, Dr. Tony Acosta ([facility]), follow up recommendations     #Elevated proBNP   - No hx of heart failure, not volume overloaded on exam  - FU echocardiogram  - Continue to monitor     #Sezary Syndrome   - Extensive cutaneous disease, actively undergoing infusions q3 weeks   - Pt has PET scan scheduled for 11/20  - Continue home medications     #HTN   - Soft BP to normotensive on presentation   - Hold antihypertensives for now (On ARB-thiazide and amlodipine at home)  - Monitor hemodynamics    #Need for prophylactic measures   - VTE prophylaxis: SC Lovenox

## 2023-11-11 NOTE — H&P ADULT - NSICDXPASTMEDICALHX_GEN_ALL_CORE_FT
PAST MEDICAL HISTORY:  BPH (benign prostatic hyperplasia)     Chronic neck pain     Degenerative disc disease, cervical     HTN (hypertension)     T-cell lymphoma

## 2023-11-11 NOTE — CONSULT NOTE ADULT - ASSESSMENT
[facility]  Chart Reviewed-Bcx with MSSA in both sets. Send repeat Bcx x2, start on cefazolin, obtain TTE. D/w Dr. Clarke  Full Consult to follow for any immediate concerns please feel free to contact us directly at [phone] and have us paged  Chiquis Zelaya MD

## 2023-11-11 NOTE — CONSULT NOTE ADULT - SUBJECTIVE AND OBJECTIVE BOX
Date/Time Patient Seen:  		  Referring MD:   Data Reviewed	       Patient is a 68y old  Male who presents with a chief complaint of shortness of breath (11 Nov 2023 00:30)      Subjective/HPI    Patient Identity:  · Birth Sex	Male     History of Present Illness:  Reason for Admission: shortness of breath  History of Present Illness:   68y male with PMHx of Sezary Syndrome (T-Cell Lymphoma), COPD, tobacco use, HTN, anxiety presented to the ER for shortness of breath for the past 24 hours. Pt states he has been experiencing shortness of breath when laying down for the last few days, which resolved with sitting up. However, last night pt states when laying down he became short of breath with "tightness" in his airway that persistent for 30 minutes. Endorses persistent shortness of breath with exertion, but feels well at rest. Endorses recent cough, wheeze, fatigue, and new kin lesions. Denies pain with inhalation, but states it felt tight and was unable to move air. Pt was diagnosed with Sezary Syndrome in 2021 and is actively undergoing treatment. States his lymph nodes in his neck have become more prominent and lesions have worsened on his face, abdomen, arms, and legs. Pt endorses significant stress in his life and becomes dyspneic when speaking about it. Pt also states his lesions seem to flare when he is under stress. Due for a PET-CT with IV contrast on 11/20. SpO2 in the low 90th% on supplemental O2 via NC.    PAST MEDICAL & SURGICAL HISTORY:  HTN (hypertension)    Degenerative disc disease, cervical    Chronic neck pain    BPH (benign prostatic hyperplasia)    T-cell lymphoma    S/P hernia repair    FAMILY HISTORY:  Family history of Sezary's disease.     Social History:  · Substance use	Yes  · Alcohol use	Denies  · Substance use	Denies  · Tobacco use	50+ pack year hx, currently smokes <0.5 packs per day  · Social History (marital status, living situation, occupation, and sexual history)	Lives: Wife, 97 year old mother is moving in   ADLs: Independent     Tobacco Screening:  · Core Measure Site	Yes  · Has the patient used tobacco in the past 30 days?	Yes  · Tobacco Cessation Education/Counseling	Offered and patient declined  · Tobacco Cessation Medication	Offered and patient declined    Risk Assessment:    Present on Admission:  Deep Venous Thrombosis	no  Pulmonary Embolus	suspected     HIV Screening:  · In accordance with [place] State law, we offer every patient who comes to our ED an HIV test. Would you like to be tested today?	Opt out        Medication list         MEDICATIONS  (STANDING):  albuterol/ipratropium for Nebulization 3 milliLiter(s) Nebulizer every 6 hours  aspirin enteric coated 81 milliGRAM(s) Oral daily  enoxaparin Injectable 40 milliGRAM(s) SubCutaneous every 24 hours  influenza  Vaccine (HIGH DOSE) 0.7 milliLiter(s) IntraMuscular once  predniSONE   Tablet 20 milliGRAM(s) Oral daily    MEDICATIONS  (PRN):  acetaminophen     Tablet .. 650 milliGRAM(s) Oral every 6 hours PRN Temp greater or equal to 38C (100.4F), Mild Pain (1 - 3)  albuterol    0.083% 2.5 milliGRAM(s) Nebulizer every 3 hours PRN Shortness of Breath and/or Wheezing  ALPRAZolam 0.25 milliGRAM(s) Oral at bedtime PRN Anxiety  aluminum hydroxide/magnesium hydroxide/simethicone Suspension 30 milliLiter(s) Oral every 4 hours PRN Dyspepsia  hydrocortisone 2.5% Cream 1 Application(s) Topical daily PRN Rash and/or Itching  HYDROmorphone   Tablet 4 milliGRAM(s) Oral every 3 hours PRN Moderate Pain (4 - 6)  melatonin 3 milliGRAM(s) Oral at bedtime PRN Insomnia  ondansetron Injectable 4 milliGRAM(s) IV Push every 8 hours PRN Nausea and/or Vomiting  triamcinolone 0.1% Cream 1 Application(s) Topical every 12 hours PRN skin irritation         Vitals log        ICU Vital Signs Last 24 Hrs  T(C): 36.3 (11 Nov 2023 00:44), Max: 37.2 (10 Nov 2023 22:26)  T(F): 97.4 (11 Nov 2023 00:44), Max: 99 (10 Nov 2023 22:26)  HR: 97 (11 Nov 2023 00:44) (97 - 116)  BP: 111/77 (11 Nov 2023 00:44) (111/77 - 119/82)  BP(mean): --  ABP: --  ABP(mean): --  RR: 19 (11 Nov 2023 00:44) (19 - 22)  SpO2: 92% (11 Nov 2023 00:44) (92% - 99%)    O2 Parameters below as of 11 Nov 2023 00:44  Patient On (Oxygen Delivery Method): nasal cannula  O2 Flow (L/min): 4               Input and Output:  I&O's Detail      Lab Data                        14.3   10.03 )-----------( 300      ( 10 Nov 2023 23:00 )             42.4     11-10    143  |  106  |  21  ----------------------------<  118<H>  3.4<L>   |  28  |  1.10    Ca    8.5      10 Nov 2023 23:00    TPro  7.1  /  Alb  3.1<L>  /  TBili  1.0  /  DBili  x   /  AST  13<L>  /  ALT  21  /  AlkPhos  146<H>  11-10      CARDIAC MARKERS ( 11 Nov 2023 01:32 )  x     / x     / 79 U/L / x     / 3.6 ng/mL  CARDIAC MARKERS ( 10 Nov 2023 23:00 )  x     / x     / 73 U/L / x     / x            Review of Systems	  sob  pinedo  weakness      Objective     Physical Examination        Pertinent Lab findings & Imaging      Jennifer:  NO   Adequate UO     I&O's Detail           Discussed with:     Cultures:	        Radiology                             Date/Time Patient Seen:  		  Referring MD:   Data Reviewed	       Patient is a 68y old  Male who presents with a chief complaint of shortness of breath (11 Nov 2023 00:30)      Subjective/HPI    Patient Identity:  · Birth Sex	Male     History of Present Illness:  Reason for Admission: shortness of breath  History of Present Illness:   68y male with PMHx of Sezary Syndrome (T-Cell Lymphoma), COPD, tobacco use, HTN, anxiety presented to the ER for shortness of breath for the past 24 hours. Pt states he has been experiencing shortness of breath when laying down for the last few days, which resolved with sitting up. However, last night pt states when laying down he became short of breath with "tightness" in his airway that persistent for 30 minutes. Endorses persistent shortness of breath with exertion, but feels well at rest. Endorses recent cough, wheeze, fatigue, and new kin lesions. Denies pain with inhalation, but states it felt tight and was unable to move air. Pt was diagnosed with Sezary Syndrome in 2021 and is actively undergoing treatment. States his lymph nodes in his neck have become more prominent and lesions have worsened on his face, abdomen, arms, and legs. Pt endorses significant stress in his life and becomes dyspneic when speaking about it. Pt also states his lesions seem to flare when he is under stress. Due for a PET-CT with IV contrast on 11/20. SpO2 in the low 90th% on supplemental O2 via NC.    PAST MEDICAL & SURGICAL HISTORY:  HTN (hypertension)    Degenerative disc disease, cervical    Chronic neck pain    BPH (benign prostatic hyperplasia)    T-cell lymphoma    S/P hernia repair    FAMILY HISTORY:  Family history of Sezary's disease.     Social History:  · Substance use	Yes  · Alcohol use	Denies  · Substance use	Denies  · Tobacco use	50+ pack year hx, currently smokes <0.5 packs per day  · Social History (marital status, living situation, occupation, and sexual history)	Lives: Wife, 97 year old mother is moving in   ADLs: Independent     Tobacco Screening:  · Core Measure Site	Yes  · Has the patient used tobacco in the past 30 days?	Yes  · Tobacco Cessation Education/Counseling	Offered and patient declined  · Tobacco Cessation Medication	Offered and patient declined    Risk Assessment:    Present on Admission:  Deep Venous Thrombosis	no  Pulmonary Embolus	suspected     HIV Screening:  · In accordance with [place] State law, we offer every patient who comes to our ED an HIV test. Would you like to be tested today?	Opt out        Medication list         MEDICATIONS  (STANDING):  albuterol/ipratropium for Nebulization 3 milliLiter(s) Nebulizer every 6 hours  aspirin enteric coated 81 milliGRAM(s) Oral daily  enoxaparin Injectable 40 milliGRAM(s) SubCutaneous every 24 hours  influenza  Vaccine (HIGH DOSE) 0.7 milliLiter(s) IntraMuscular once  predniSONE   Tablet 20 milliGRAM(s) Oral daily    MEDICATIONS  (PRN):  acetaminophen     Tablet .. 650 milliGRAM(s) Oral every 6 hours PRN Temp greater or equal to 38C (100.4F), Mild Pain (1 - 3)  albuterol    0.083% 2.5 milliGRAM(s) Nebulizer every 3 hours PRN Shortness of Breath and/or Wheezing  ALPRAZolam 0.25 milliGRAM(s) Oral at bedtime PRN Anxiety  aluminum hydroxide/magnesium hydroxide/simethicone Suspension 30 milliLiter(s) Oral every 4 hours PRN Dyspepsia  hydrocortisone 2.5% Cream 1 Application(s) Topical daily PRN Rash and/or Itching  HYDROmorphone   Tablet 4 milliGRAM(s) Oral every 3 hours PRN Moderate Pain (4 - 6)  melatonin 3 milliGRAM(s) Oral at bedtime PRN Insomnia  ondansetron Injectable 4 milliGRAM(s) IV Push every 8 hours PRN Nausea and/or Vomiting  triamcinolone 0.1% Cream 1 Application(s) Topical every 12 hours PRN skin irritation         Vitals log        ICU Vital Signs Last 24 Hrs  T(C): 36.3 (11 Nov 2023 00:44), Max: 37.2 (10 Nov 2023 22:26)  T(F): 97.4 (11 Nov 2023 00:44), Max: 99 (10 Nov 2023 22:26)  HR: 97 (11 Nov 2023 00:44) (97 - 116)  BP: 111/77 (11 Nov 2023 00:44) (111/77 - 119/82)  BP(mean): --  ABP: --  ABP(mean): --  RR: 19 (11 Nov 2023 00:44) (19 - 22)  SpO2: 92% (11 Nov 2023 00:44) (92% - 99%)    O2 Parameters below as of 11 Nov 2023 00:44  Patient On (Oxygen Delivery Method): nasal cannula  O2 Flow (L/min): 4               Input and Output:  I&O's Detail      Lab Data                        14.3   10.03 )-----------( 300      ( 10 Nov 2023 23:00 )             42.4     11-10    143  |  106  |  21  ----------------------------<  118<H>  3.4<L>   |  28  |  1.10    Ca    8.5      10 Nov 2023 23:00    TPro  7.1  /  Alb  3.1<L>  /  TBili  1.0  /  DBili  x   /  AST  13<L>  /  ALT  21  /  AlkPhos  146<H>  11-10      CARDIAC MARKERS ( 11 Nov 2023 01:32 )  x     / x     / 79 U/L / x     / 3.6 ng/mL  CARDIAC MARKERS ( 10 Nov 2023 23:00 )  x     / x     / 73 U/L / x     / x            Review of Systems	  sob  pinedo  weakness      Objective     Physical Examination    heart s1s2  lung dec BS  head nc  verbal  alert  moves all extr      Pertinent Lab findings & Imaging      Rodriguez:  NO   Adequate UO     I&O's Detail           Discussed with:     Cultures:	        Radiology    ct with PE

## 2023-11-11 NOTE — H&P ADULT - NSHPPHYSICALEXAM_GEN_ALL_CORE
T(C): 37.2 (11-10-23 @ 22:26), Max: 37.2 (11-10-23 @ 22:26)  HR: 116 (11-10-23 @ 22:26) (116 - 116)  BP: 119/82 (11-10-23 @ 22:26) (119/82 - 119/82)  RR: 22 (11-10-23 @ 22:26) (22 - 22)  SpO2: 99% (11-10-23 @ 22:26) (99% - 99%)    GENERAL: patient appears comfortable, though dyspneic with exertion, no acute distress at rest, appropriately interactive  EYES: sclera clear, no exudates, lesions causing ptosis of right eye   FACE: Diffuse cutaneous lesions; 3x2cm raised lesion on right temple, 2x1cm raised lesion on left lateral nostril   NECK: Extensive anterior and posterior cervical lymph node enlargement bilaterally   LUNGS: good air entry bilaterally, mild tightness appreciated in LLL, remainder of lung exam clear, no wheezing or rhonchi appreciated  HEART: soft S1/S2, tachycardic, no murmurs noted, no lower extremity edema  GASTROINTESTINAL: abdomen is soft, nontender, nondistended, normoactive bowel sounds, lesions appreciated across entire waistline   INTEGUMENT: diffuse cutaneous disease   MUSCULOSKELETAL: no clubbing or cyanosis, no obvious deformity  NEUROLOGIC: awake, alert, oriented x3, good muscle tone in all 4 extremities T(C): 37.2 (11-10-23 @ 22:26), Max: 37.2 (11-10-23 @ 22:26)  HR: 116 (11-10-23 @ 22:26) (116 - 116)  BP: 119/82 (11-10-23 @ 22:26) (119/82 - 119/82)  RR: 22 (11-10-23 @ 22:26) (22 - 22)  SpO2: 99% (11-10-23 @ 22:26) (99% - 99%)    GENERAL: patient appears comfortable, though dyspneic with exertion, no acute distress at rest, appropriately interactive  EYES: sclera clear, no exudates, lesions causing ptosis of right eye   FACE: Diffuse cutaneous lesions; 3x2cm raised lesion on right temple, 2x1cm raised lesion on left lateral nostril   NECK: Extensive anterior and posterior cervical lymph node enlargement bilaterally   LUNGS: good air entry bilaterally, mild tightness appreciated in LLL, remainder of lung exam clear, no wheezing or rhonchi appreciated  HEART: soft S1/S2, tachycardic, systolic murmur noted, no lower extremity edema  GASTROINTESTINAL: abdomen is soft, nontender, nondistended, normoactive bowel sounds, lesions appreciated across entire waistline   INTEGUMENT: diffuse cutaneous disease   MUSCULOSKELETAL: no clubbing or cyanosis, no obvious deformity  NEUROLOGIC: awake, alert, oriented x3, good muscle tone in all 4 extremities  PSYCH: anxious

## 2023-11-12 LAB
ANION GAP SERPL CALC-SCNC: 6 MMOL/L — SIGNIFICANT CHANGE UP (ref 5–17)
ANION GAP SERPL CALC-SCNC: 6 MMOL/L — SIGNIFICANT CHANGE UP (ref 5–17)
APTT BLD: 65.8 SEC — HIGH (ref 24.5–35.6)
APTT BLD: 65.8 SEC — HIGH (ref 24.5–35.6)
APTT BLD: 77.9 SEC — HIGH (ref 24.5–35.6)
APTT BLD: 77.9 SEC — HIGH (ref 24.5–35.6)
BASOPHILS # BLD AUTO: 0.06 K/UL — SIGNIFICANT CHANGE UP (ref 0–0.2)
BASOPHILS # BLD AUTO: 0.06 K/UL — SIGNIFICANT CHANGE UP (ref 0–0.2)
BASOPHILS NFR BLD AUTO: 0.7 % — SIGNIFICANT CHANGE UP (ref 0–2)
BASOPHILS NFR BLD AUTO: 0.7 % — SIGNIFICANT CHANGE UP (ref 0–2)
BUN SERPL-MCNC: 22 MG/DL — SIGNIFICANT CHANGE UP (ref 7–23)
BUN SERPL-MCNC: 22 MG/DL — SIGNIFICANT CHANGE UP (ref 7–23)
CALCIUM SERPL-MCNC: 8.3 MG/DL — LOW (ref 8.5–10.1)
CALCIUM SERPL-MCNC: 8.3 MG/DL — LOW (ref 8.5–10.1)
CHLORIDE SERPL-SCNC: 110 MMOL/L — HIGH (ref 96–108)
CHLORIDE SERPL-SCNC: 110 MMOL/L — HIGH (ref 96–108)
CO2 SERPL-SCNC: 28 MMOL/L — SIGNIFICANT CHANGE UP (ref 22–31)
CO2 SERPL-SCNC: 28 MMOL/L — SIGNIFICANT CHANGE UP (ref 22–31)
CREAT SERPL-MCNC: 1.1 MG/DL — SIGNIFICANT CHANGE UP (ref 0.5–1.3)
CREAT SERPL-MCNC: 1.1 MG/DL — SIGNIFICANT CHANGE UP (ref 0.5–1.3)
CRP SERPL-MCNC: 41 MG/L — HIGH
CRP SERPL-MCNC: 41 MG/L — HIGH
EGFR: 74 ML/MIN/1.73M2 — SIGNIFICANT CHANGE UP
EGFR: 74 ML/MIN/1.73M2 — SIGNIFICANT CHANGE UP
EOSINOPHIL # BLD AUTO: 0.04 K/UL — SIGNIFICANT CHANGE UP (ref 0–0.5)
EOSINOPHIL # BLD AUTO: 0.04 K/UL — SIGNIFICANT CHANGE UP (ref 0–0.5)
EOSINOPHIL NFR BLD AUTO: 0.5 % — SIGNIFICANT CHANGE UP (ref 0–6)
EOSINOPHIL NFR BLD AUTO: 0.5 % — SIGNIFICANT CHANGE UP (ref 0–6)
GLUCOSE SERPL-MCNC: 123 MG/DL — HIGH (ref 70–99)
GLUCOSE SERPL-MCNC: 123 MG/DL — HIGH (ref 70–99)
HCT VFR BLD CALC: 38.3 % — LOW (ref 39–50)
HCT VFR BLD CALC: 38.3 % — LOW (ref 39–50)
HGB BLD-MCNC: 12.5 G/DL — LOW (ref 13–17)
HGB BLD-MCNC: 12.5 G/DL — LOW (ref 13–17)
IMM GRANULOCYTES NFR BLD AUTO: 1 % — HIGH (ref 0–0.9)
IMM GRANULOCYTES NFR BLD AUTO: 1 % — HIGH (ref 0–0.9)
LYMPHOCYTES # BLD AUTO: 0.84 K/UL — LOW (ref 1–3.3)
LYMPHOCYTES # BLD AUTO: 0.84 K/UL — LOW (ref 1–3.3)
LYMPHOCYTES # BLD AUTO: 10 % — LOW (ref 13–44)
LYMPHOCYTES # BLD AUTO: 10 % — LOW (ref 13–44)
MCHC RBC-ENTMCNC: 29.8 PG — SIGNIFICANT CHANGE UP (ref 27–34)
MCHC RBC-ENTMCNC: 29.8 PG — SIGNIFICANT CHANGE UP (ref 27–34)
MCHC RBC-ENTMCNC: 32.6 GM/DL — SIGNIFICANT CHANGE UP (ref 32–36)
MCHC RBC-ENTMCNC: 32.6 GM/DL — SIGNIFICANT CHANGE UP (ref 32–36)
MCV RBC AUTO: 91.2 FL — SIGNIFICANT CHANGE UP (ref 80–100)
MCV RBC AUTO: 91.2 FL — SIGNIFICANT CHANGE UP (ref 80–100)
MONOCYTES # BLD AUTO: 0.57 K/UL — SIGNIFICANT CHANGE UP (ref 0–0.9)
MONOCYTES # BLD AUTO: 0.57 K/UL — SIGNIFICANT CHANGE UP (ref 0–0.9)
MONOCYTES NFR BLD AUTO: 6.8 % — SIGNIFICANT CHANGE UP (ref 2–14)
MONOCYTES NFR BLD AUTO: 6.8 % — SIGNIFICANT CHANGE UP (ref 2–14)
NEUTROPHILS # BLD AUTO: 6.77 K/UL — SIGNIFICANT CHANGE UP (ref 1.8–7.4)
NEUTROPHILS # BLD AUTO: 6.77 K/UL — SIGNIFICANT CHANGE UP (ref 1.8–7.4)
NEUTROPHILS NFR BLD AUTO: 81 % — HIGH (ref 43–77)
NEUTROPHILS NFR BLD AUTO: 81 % — HIGH (ref 43–77)
NRBC # BLD: 0 /100 WBCS — SIGNIFICANT CHANGE UP (ref 0–0)
NRBC # BLD: 0 /100 WBCS — SIGNIFICANT CHANGE UP (ref 0–0)
PLATELET # BLD AUTO: 329 K/UL — SIGNIFICANT CHANGE UP (ref 150–400)
PLATELET # BLD AUTO: 329 K/UL — SIGNIFICANT CHANGE UP (ref 150–400)
POTASSIUM SERPL-MCNC: 3.1 MMOL/L — LOW (ref 3.5–5.3)
POTASSIUM SERPL-MCNC: 3.1 MMOL/L — LOW (ref 3.5–5.3)
POTASSIUM SERPL-SCNC: 3.1 MMOL/L — LOW (ref 3.5–5.3)
POTASSIUM SERPL-SCNC: 3.1 MMOL/L — LOW (ref 3.5–5.3)
PROCALCITONIN SERPL-MCNC: 0.09 NG/ML — HIGH
PROCALCITONIN SERPL-MCNC: 0.09 NG/ML — HIGH
RBC # BLD: 4.2 M/UL — SIGNIFICANT CHANGE UP (ref 4.2–5.8)
RBC # BLD: 4.2 M/UL — SIGNIFICANT CHANGE UP (ref 4.2–5.8)
RBC # FLD: 14.4 % — SIGNIFICANT CHANGE UP (ref 10.3–14.5)
RBC # FLD: 14.4 % — SIGNIFICANT CHANGE UP (ref 10.3–14.5)
SODIUM SERPL-SCNC: 144 MMOL/L — SIGNIFICANT CHANGE UP (ref 135–145)
SODIUM SERPL-SCNC: 144 MMOL/L — SIGNIFICANT CHANGE UP (ref 135–145)
WBC # BLD: 8.36 K/UL — SIGNIFICANT CHANGE UP (ref 3.8–10.5)
WBC # BLD: 8.36 K/UL — SIGNIFICANT CHANGE UP (ref 3.8–10.5)
WBC # FLD AUTO: 8.36 K/UL — SIGNIFICANT CHANGE UP (ref 3.8–10.5)
WBC # FLD AUTO: 8.36 K/UL — SIGNIFICANT CHANGE UP (ref 3.8–10.5)

## 2023-11-12 RX ORDER — POTASSIUM CHLORIDE 20 MEQ
40 PACKET (EA) ORAL ONCE
Refills: 0 | Status: COMPLETED | OUTPATIENT
Start: 2023-11-12 | End: 2023-11-12

## 2023-11-12 RX ADMIN — Medication 100 MILLIGRAM(S): at 13:28

## 2023-11-12 RX ADMIN — HYDROMORPHONE HYDROCHLORIDE 4 MILLIGRAM(S): 2 INJECTION INTRAMUSCULAR; INTRAVENOUS; SUBCUTANEOUS at 11:56

## 2023-11-12 RX ADMIN — HEPARIN SODIUM 1500 UNIT(S)/HR: 5000 INJECTION INTRAVENOUS; SUBCUTANEOUS at 16:30

## 2023-11-12 RX ADMIN — Medication 81 MILLIGRAM(S): at 11:31

## 2023-11-12 RX ADMIN — Medication 4 MILLIGRAM(S): at 11:30

## 2023-11-12 RX ADMIN — HYDROMORPHONE HYDROCHLORIDE 4 MILLIGRAM(S): 2 INJECTION INTRAMUSCULAR; INTRAVENOUS; SUBCUTANEOUS at 12:56

## 2023-11-12 RX ADMIN — HYDROMORPHONE HYDROCHLORIDE 4 MILLIGRAM(S): 2 INJECTION INTRAMUSCULAR; INTRAVENOUS; SUBCUTANEOUS at 22:15

## 2023-11-12 RX ADMIN — Medication 100 MILLIGRAM(S): at 21:42

## 2023-11-12 RX ADMIN — Medication 100 MILLIGRAM(S): at 05:26

## 2023-11-12 RX ADMIN — HEPARIN SODIUM 1500 UNIT(S)/HR: 5000 INJECTION INTRAVENOUS; SUBCUTANEOUS at 19:23

## 2023-11-12 RX ADMIN — Medication 3 MILLILITER(S): at 08:11

## 2023-11-12 RX ADMIN — Medication 40 MILLIEQUIVALENT(S): at 13:28

## 2023-11-12 RX ADMIN — Medication 3 MILLILITER(S): at 13:06

## 2023-11-12 RX ADMIN — HEPARIN SODIUM 1500 UNIT(S)/HR: 5000 INJECTION INTRAVENOUS; SUBCUTANEOUS at 06:16

## 2023-11-12 RX ADMIN — HEPARIN SODIUM 1500 UNIT(S)/HR: 5000 INJECTION INTRAVENOUS; SUBCUTANEOUS at 07:19

## 2023-11-12 RX ADMIN — Medication 4 MILLIGRAM(S): at 05:26

## 2023-11-12 RX ADMIN — HYDROMORPHONE HYDROCHLORIDE 4 MILLIGRAM(S): 2 INJECTION INTRAMUSCULAR; INTRAVENOUS; SUBCUTANEOUS at 23:15

## 2023-11-12 RX ADMIN — Medication 3 MILLILITER(S): at 19:09

## 2023-11-12 RX ADMIN — Medication 3 MILLILITER(S): at 02:30

## 2023-11-12 RX ADMIN — Medication 20 MILLIGRAM(S): at 05:26

## 2023-11-12 RX ADMIN — HEPARIN SODIUM 1500 UNIT(S)/HR: 5000 INJECTION INTRAVENOUS; SUBCUTANEOUS at 13:26

## 2023-11-12 NOTE — CONSULT NOTE ADULT - SUBJECTIVE AND OBJECTIVE BOX
[facility] DIVISION OF INFECTIOUS DISEASES  LILY Savage S. Shah, Y. Patel, G. [facility]  [phone]  ([phone] - weekdays after 5pm and weekends)    ZACKERY CAPPS  68y, Male  [id number]    HPI:  Patient is a 68 year old male with PMH of Sezary Syndrome (T-Cell Lymphoma), COPD, tobacco use, HTN, anxiety presented to the ER for shortness of breath for the past 24 hours. Pt states he has been experiencing shortness of breath when laying down for the last few days, which resolved with sitting up. However, last night pt states when laying down he became short of breath with "tightness" in his airway that persistent for 30 minutes. Endorses persistent shortness of breath with exertion, but feels well at rest. Endorses recent cough, wheeze, fatigue, and new kin lesions. Denies pain with inhalation, but states it felt tight and was unable to move air. Pt was diagnosed with Sezary Syndrome in 2021 and is actively undergoing treatment. States his lymph nodes in his neck have become more prominent and lesions have worsened on his face, abdomen, arms, and legs. Pt endorses significant stress in his life and becomes dyspneic when speaking about it. Pt also states his lesions seem to flare when he is under stress. Due for a PET-CT with IV contrast on 11/20. SpO2 in the low 90th% on supplemental O2 via NC.  His PCP Dr. Baltazar has referred him to [facility] cardiology (patient believes it was Dr. Olea) as he wanted him to obtain an echocardiogram. Has not gone yet.  ED Course: Vitals: BP: 119/82, HR: 116, Temp: 99, RR: 22, SpO2: 99% on NRB 15L    Labs: WBC 10.03, K 3.4, Cr 1.10, eGFR 74, Trop 280.6, pBNP 1145   CXR: Cardiomegaly with increased pulmonary markings, hyperinflated lungs as per personal read, official read pending   EKG: Sinus tachycardia to 107 bpm   Received in the ED:  x1, Solu-Medrol 125 mg IVP, Duoneb x1, 1L NS bolus  (11 Nov 2023 00:30)  Patient seen and examined at bedside this afternoon. Patient reports he was diagnosed with Sezary syndrome in 2021 and has been on treatment, following with oncologist and has been trying to get to dermatologist but has not been able to. He reports he showers daily and tries to take as much care as he can for his skin lesions. He denies having fevers or chills, no pain at this time.   ROS: 14 point review of systems completed, pertinent positives and negatives as per HPI.    Allergies: No Known Allergies    PMH -- HTN (hypertension)  Degenerative disc disease, cervical  Chronic neck pain  BPH (benign prostatic hyperplasia)  T-cell lymphoma    PSH -- S/P hernia repair    FH -- Family history of Sezary's disease  Social History -- smoker, denies alcohol or illicit drug use    Physical Exam--  Vital Signs Last 24 Hrs  T(F): 97.5 (12 Nov 2023 11:36), Max: 98 (12 Nov 2023 04:48)  HR: 76 (12 Nov 2023 13:07) (76 - 101)  BP: 145/78 (12 Nov 2023 11:36) (107/70 - 145/78)  RR: 18 (12 Nov 2023 11:36) (18 - 19)  SpO2: 96% (12 Nov 2023 13:07) (94% - 97%)  General: no acute distress  HEENT: NC/AT, EOMI, anicteric, neck supple  Lungs: Clear bilaterally without rales, wheezing or rhonchi  Heart: S1, S2 present, RRR. No murmur, rub or gallop.  Abdomen: Soft. Nondistended. Nontender. BS present.   Neuro: AAOx3, no obvious focal deficits   Extremities: No cyanosis or clubbing. No edema.   Skin: Diffuse nodular and flat lesions some with scabbing, erythema on back  Psychiatric: Appropriate affect and mood for situation.   Lines: PIV    Laboratory & Imaging Data--  CBC:                       12.5   8.36  )-----------( 329      ( 12 Nov 2023 04:27 )             38.3     WBC Count: 8.36 K/uL (11-12-23 @ 04:27)  WBC Count: 8.31 K/uL (11-11-23 @ 08:57)  WBC Count: 10.03 K/uL (11-10-23 @ 23:00)    CMP: 11-12    144  |  110<H>  |  22  ----------------------------<  123<H>  3.1<L>   |  28  |  1.10    Ca    8.3<L>      12 Nov 2023 04:27    TPro  7.6  /  Alb  3.3  /  TBili  1.2  /  DBili  x   /  AST  16  /  ALT  21  /  AlkPhos  151<H>  11-11    LIVER FUNCTIONS - ( 11 Nov 2023 08:57 )  Alb: 3.3 g/dL / Pro: 7.6 g/dL / ALK PHOS: 151 U/L / ALT: 21 U/L / AST: 16 U/L / GGT: x           Urinalysis Basic - ( 12 Nov 2023 04:27 )    Color: x / Appearance: x / SG: x / pH: x  Gluc: 123 mg/dL / Ketone: x  / Bili: x / Urobili: x   Blood: x / Protein: x / Nitrite: x   Leuk Esterase: x / RBC: x / WBC x   Sq Epi: x / Non Sq Epi: x / Bacteria: x      Microbiology: reviewed  Culture - Urine (collected 11-11-23 @ 00:44)  Source: Clean Catch Clean Catch (Midstream)  Final Report (11-11-23 @ 23:41):    <10,000 CFU/mL Normal Urogenital Cindy    Culture - Blood (collected 11-10-23 @ 23:15)  Source: .Blood Blood-Peripheral  Gram Stain (11-11-23 @ 17:11):    Growth in aerobic bottle: Gram Positive Cocci in Clusters    Growth in anaerobic bottle: Gram Positive Cocci in Clusters  Preliminary Report (11-12-23 @ 14:00):    Growth in aerobic and anaerobic bottles: Staphylococcus aureus    Susceptibility to follow.    Direct identification is available within approximately 3-5    hours either by Blood Panel Multiplexed PCR or Direct    MALDI-TOF. Details: [email or web address]  Organism: Blood Culture PCR (11-11-23 @ 16:24)  Organism: Blood Culture PCR (11-11-23 @ 16:24)      Method Type: PCR      -  Methicillin SENSITIVE Staphylococcus aureus (MSSA): Detec Any isolate of Staphylococcus aureus from a blood culture is NOT considered a contaminant.    Culture - Blood (collected 11-10-23 @ 23:00)  Source: .Blood Blood-Peripheral  Gram Stain (11-11-23 @ 17:03):    Growth in aerobic and anaerobic bottles: Gram Positive Cocci in Clusters  Preliminary Report (11-12-23 @ 14:01):    Growth in aerobic and anaerobic bottles: Staphylococcus aureus    See previous culture [id number]    SARS-CoV-2 Result: NotDetec (10 Nov 2023 23:00)    Radiology--reviewed  < from: US Duplex Venous Lower Ext Complete, Bilateral (11.11.23 @ 12:44) >  IMPRESSION:  Acute deep venous thrombosis: above and below the knee.    Acute deep vein thrombosis involving the right popliteal vein, posterior   tibial vein and peroneal veins.    < end of copied text >  < from: CT Angio Chest PE Protocol w/ IV Cont (11.11.23 @ 03:25) >  IMPRESSION:  Fairly extensive bilateral pulmonary emboli as discussed above.    Mild cardiomegaly with right greater than left heart enlargement and an   RV: LV ratio slightly > 1.  No flattening of the intraventricular septum   is identified.  The findings may be related to pre-existing right heart   disease however follow-up echocardiogram is recommended to exclude acute   right heart strain.    Nodular foci of skin thickening in the anterior chest wall and right jaw   region may represent masses/lymphoma; correlate with physical exam   findings.  Supraclavicular lymphadenopathy.  Prominent axillary lymph   nodes bilaterally.    I reported the pulmonary embolism and possibility of right heart strain   with Dr. Youngblood on 11/11/2023 at 6:00 AM.  Read back verification was   obtained.    < end of copied text >  < from: Xray Chest 1 View- PORTABLE-Urgent (11.10.23 @ 23:38) >  IMPRESSION: Grossly clear lungs    < end of copied text >    Active Medications--  acetaminophen     Tablet .. 650 milliGRAM(s) Oral every 6 hours PRN  albuterol    0.083% 2.5 milliGRAM(s) Nebulizer every 3 hours PRN  albuterol/ipratropium for Nebulization 3 milliLiter(s) Nebulizer every 6 hours  ALPRAZolam 0.25 milliGRAM(s) Oral at bedtime PRN  aluminum hydroxide/magnesium hydroxide/simethicone Suspension 30 milliLiter(s) Oral every 4 hours PRN  aspirin enteric coated 81 milliGRAM(s) Oral daily  ceFAZolin   IVPB 2000 milliGRAM(s) IV Intermittent every 8 hours  ceFAZolin   IVPB      heparin   Injectable 6000 Unit(s) IV Push every 6 hours PRN  heparin   Injectable 3000 Unit(s) IV Push every 6 hours PRN  heparin  Infusion.  Unit(s)/Hr IV Continuous <Continuous>  hydrocortisone 2.5% Cream 1 Application(s) Topical daily PRN  HYDROmorphone   Tablet 4 milliGRAM(s) Oral every 3 hours PRN  influenza  Vaccine (HIGH DOSE) 0.7 milliLiter(s) IntraMuscular once  melatonin 3 milliGRAM(s) Oral at bedtime PRN  nicotine  Polacrilex Gum 4 milliGRAM(s) Oral every 6 hours  ondansetron Injectable 4 milliGRAM(s) IV Push every 8 hours PRN  predniSONE   Tablet 20 milliGRAM(s) Oral daily  triamcinolone 0.1% Cream 1 Application(s) Topical every 12 hours PRN    Current Antimicrobials:   ceFAZolin   IVPB 2000 milliGRAM(s) IV Intermittent every 8 hours  ceFAZolin   IVPB        Prior/Completed Antimicrobials:  ceFAZolin   IVPB    Immunologic:   influenza  Vaccine (HIGH DOSE) 0.7 milliLiter(s) IntraMuscular once

## 2023-11-12 NOTE — CONSULT NOTE ADULT - ASSESSMENT
Patient is a 68 year old male with PMH of Sezary Syndrome (T-Cell Lymphoma), COPD, tobacco use, HTN, anxiety presented to the ER for shortness of breath for the past 24 hours.  Patient was admitted for PE and DVT and now found to have MSSA bacteremia.     MSSA bacteremia likely due to skin source   - has multiple skin lesions due to Sezary syndrome, not look acutely infected but likely portal of entry   - has been afebrile, no leukocytosis   - 11/10 Bcx with Staph aureus - MSSA per PCR   - TTE with no vegetations noted; has normal LVEF, mild pulmonary htn, RA dilated in size     PE/DVT - on heparin drip    - CTA with b/l PE; LE duplex with acute RLE DVT   - Pulmonary and Cardiology following     Recommendations:   Follow repeat Bcx from 11/12 am - in process x2   Follow Bcx for Staph sensitivities  Continue on cefazolin 2g IV Q8h  Monitor temps/WBC  Continue rest of care per primary team     D/w Dr. Vince Zelaya M.D.  [facility], Division of Infectious Diseases  [phone]  After 5pm on weekdays and all day on weekends - please call [phone]

## 2023-11-13 LAB
-  AMPICILLIN/SULBACTAM: SIGNIFICANT CHANGE UP
-  AMPICILLIN/SULBACTAM: SIGNIFICANT CHANGE UP
-  CEFAZOLIN: SIGNIFICANT CHANGE UP
-  CEFAZOLIN: SIGNIFICANT CHANGE UP
-  CLINDAMYCIN: SIGNIFICANT CHANGE UP
-  CLINDAMYCIN: SIGNIFICANT CHANGE UP
-  ERYTHROMYCIN: SIGNIFICANT CHANGE UP
-  ERYTHROMYCIN: SIGNIFICANT CHANGE UP
-  GENTAMICIN: SIGNIFICANT CHANGE UP
-  GENTAMICIN: SIGNIFICANT CHANGE UP
-  OXACILLIN: SIGNIFICANT CHANGE UP
-  OXACILLIN: SIGNIFICANT CHANGE UP
-  PENICILLIN: SIGNIFICANT CHANGE UP
-  PENICILLIN: SIGNIFICANT CHANGE UP
-  RIFAMPIN: SIGNIFICANT CHANGE UP
-  RIFAMPIN: SIGNIFICANT CHANGE UP
-  TETRACYCLINE: SIGNIFICANT CHANGE UP
-  TETRACYCLINE: SIGNIFICANT CHANGE UP
-  TRIMETHOPRIM/SULFAMETHOXAZOLE: SIGNIFICANT CHANGE UP
-  TRIMETHOPRIM/SULFAMETHOXAZOLE: SIGNIFICANT CHANGE UP
-  VANCOMYCIN: SIGNIFICANT CHANGE UP
-  VANCOMYCIN: SIGNIFICANT CHANGE UP
ANION GAP SERPL CALC-SCNC: 7 MMOL/L — SIGNIFICANT CHANGE UP (ref 5–17)
ANION GAP SERPL CALC-SCNC: 7 MMOL/L — SIGNIFICANT CHANGE UP (ref 5–17)
APTT BLD: 56.6 SEC — HIGH (ref 24.5–35.6)
APTT BLD: 56.6 SEC — HIGH (ref 24.5–35.6)
APTT BLD: 71.6 SEC — HIGH (ref 24.5–35.6)
APTT BLD: 71.6 SEC — HIGH (ref 24.5–35.6)
APTT BLD: 81.7 SEC — HIGH (ref 24.5–35.6)
APTT BLD: 81.7 SEC — HIGH (ref 24.5–35.6)
BUN SERPL-MCNC: 17 MG/DL — SIGNIFICANT CHANGE UP (ref 7–23)
BUN SERPL-MCNC: 17 MG/DL — SIGNIFICANT CHANGE UP (ref 7–23)
CALCIUM SERPL-MCNC: 8.5 MG/DL — SIGNIFICANT CHANGE UP (ref 8.5–10.1)
CALCIUM SERPL-MCNC: 8.5 MG/DL — SIGNIFICANT CHANGE UP (ref 8.5–10.1)
CHLORIDE SERPL-SCNC: 111 MMOL/L — HIGH (ref 96–108)
CHLORIDE SERPL-SCNC: 111 MMOL/L — HIGH (ref 96–108)
CO2 SERPL-SCNC: 26 MMOL/L — SIGNIFICANT CHANGE UP (ref 22–31)
CO2 SERPL-SCNC: 26 MMOL/L — SIGNIFICANT CHANGE UP (ref 22–31)
CREAT SERPL-MCNC: 1.1 MG/DL — SIGNIFICANT CHANGE UP (ref 0.5–1.3)
CREAT SERPL-MCNC: 1.1 MG/DL — SIGNIFICANT CHANGE UP (ref 0.5–1.3)
CULTURE RESULTS: ABNORMAL
EGFR: 74 ML/MIN/1.73M2 — SIGNIFICANT CHANGE UP
EGFR: 74 ML/MIN/1.73M2 — SIGNIFICANT CHANGE UP
GLUCOSE SERPL-MCNC: 110 MG/DL — HIGH (ref 70–99)
GLUCOSE SERPL-MCNC: 110 MG/DL — HIGH (ref 70–99)
HCT VFR BLD CALC: 38.1 % — LOW (ref 39–50)
HCT VFR BLD CALC: 38.1 % — LOW (ref 39–50)
HGB BLD-MCNC: 12.2 G/DL — LOW (ref 13–17)
HGB BLD-MCNC: 12.2 G/DL — LOW (ref 13–17)
MCHC RBC-ENTMCNC: 29.9 PG — SIGNIFICANT CHANGE UP (ref 27–34)
MCHC RBC-ENTMCNC: 29.9 PG — SIGNIFICANT CHANGE UP (ref 27–34)
MCHC RBC-ENTMCNC: 32 GM/DL — SIGNIFICANT CHANGE UP (ref 32–36)
MCHC RBC-ENTMCNC: 32 GM/DL — SIGNIFICANT CHANGE UP (ref 32–36)
MCV RBC AUTO: 93.4 FL — SIGNIFICANT CHANGE UP (ref 80–100)
MCV RBC AUTO: 93.4 FL — SIGNIFICANT CHANGE UP (ref 80–100)
METHOD TYPE: SIGNIFICANT CHANGE UP
METHOD TYPE: SIGNIFICANT CHANGE UP
NRBC # BLD: 0 /100 WBCS — SIGNIFICANT CHANGE UP (ref 0–0)
NRBC # BLD: 0 /100 WBCS — SIGNIFICANT CHANGE UP (ref 0–0)
ORGANISM # SPEC MICROSCOPIC CNT: ABNORMAL
ORGANISM # SPEC MICROSCOPIC CNT: SIGNIFICANT CHANGE UP
ORGANISM # SPEC MICROSCOPIC CNT: SIGNIFICANT CHANGE UP
PLATELET # BLD AUTO: 323 K/UL — SIGNIFICANT CHANGE UP (ref 150–400)
PLATELET # BLD AUTO: 323 K/UL — SIGNIFICANT CHANGE UP (ref 150–400)
POTASSIUM SERPL-MCNC: 4 MMOL/L — SIGNIFICANT CHANGE UP (ref 3.5–5.3)
POTASSIUM SERPL-MCNC: 4 MMOL/L — SIGNIFICANT CHANGE UP (ref 3.5–5.3)
POTASSIUM SERPL-SCNC: 4 MMOL/L — SIGNIFICANT CHANGE UP (ref 3.5–5.3)
POTASSIUM SERPL-SCNC: 4 MMOL/L — SIGNIFICANT CHANGE UP (ref 3.5–5.3)
RBC # BLD: 4.08 M/UL — LOW (ref 4.2–5.8)
RBC # BLD: 4.08 M/UL — LOW (ref 4.2–5.8)
RBC # FLD: 14.8 % — HIGH (ref 10.3–14.5)
RBC # FLD: 14.8 % — HIGH (ref 10.3–14.5)
SODIUM SERPL-SCNC: 144 MMOL/L — SIGNIFICANT CHANGE UP (ref 135–145)
SODIUM SERPL-SCNC: 144 MMOL/L — SIGNIFICANT CHANGE UP (ref 135–145)
SPECIMEN SOURCE: SIGNIFICANT CHANGE UP
WBC # BLD: 8.6 K/UL — SIGNIFICANT CHANGE UP (ref 3.8–10.5)
WBC # BLD: 8.6 K/UL — SIGNIFICANT CHANGE UP (ref 3.8–10.5)
WBC # FLD AUTO: 8.6 K/UL — SIGNIFICANT CHANGE UP (ref 3.8–10.5)
WBC # FLD AUTO: 8.6 K/UL — SIGNIFICANT CHANGE UP (ref 3.8–10.5)

## 2023-11-13 RX ADMIN — Medication 100 MILLIGRAM(S): at 14:50

## 2023-11-13 RX ADMIN — HEPARIN SODIUM 1700 UNIT(S)/HR: 5000 INJECTION INTRAVENOUS; SUBCUTANEOUS at 10:22

## 2023-11-13 RX ADMIN — HYDROMORPHONE HYDROCHLORIDE 4 MILLIGRAM(S): 2 INJECTION INTRAMUSCULAR; INTRAVENOUS; SUBCUTANEOUS at 15:55

## 2023-11-13 RX ADMIN — Medication 3 MILLILITER(S): at 13:57

## 2023-11-13 RX ADMIN — HYDROMORPHONE HYDROCHLORIDE 4 MILLIGRAM(S): 2 INJECTION INTRAMUSCULAR; INTRAVENOUS; SUBCUTANEOUS at 08:22

## 2023-11-13 RX ADMIN — Medication 20 MILLIGRAM(S): at 05:23

## 2023-11-13 RX ADMIN — Medication 4 MILLIGRAM(S): at 18:44

## 2023-11-13 RX ADMIN — HEPARIN SODIUM 1700 UNIT(S)/HR: 5000 INJECTION INTRAVENOUS; SUBCUTANEOUS at 19:22

## 2023-11-13 RX ADMIN — HEPARIN SODIUM 3000 UNIT(S): 5000 INJECTION INTRAVENOUS; SUBCUTANEOUS at 09:27

## 2023-11-13 RX ADMIN — Medication 1 APPLICATION(S): at 12:00

## 2023-11-13 RX ADMIN — HEPARIN SODIUM 1500 UNIT(S)/HR: 5000 INJECTION INTRAVENOUS; SUBCUTANEOUS at 08:03

## 2023-11-13 RX ADMIN — Medication 81 MILLIGRAM(S): at 12:00

## 2023-11-13 RX ADMIN — Medication 3 MILLILITER(S): at 00:31

## 2023-11-13 RX ADMIN — Medication 100 MILLIGRAM(S): at 21:05

## 2023-11-13 RX ADMIN — Medication 100 MILLIGRAM(S): at 05:23

## 2023-11-13 RX ADMIN — Medication 3 MILLILITER(S): at 20:46

## 2023-11-13 RX ADMIN — Medication 1 APPLICATION(S): at 12:05

## 2023-11-13 RX ADMIN — Medication 3 MILLILITER(S): at 07:54

## 2023-11-13 RX ADMIN — HYDROMORPHONE HYDROCHLORIDE 4 MILLIGRAM(S): 2 INJECTION INTRAMUSCULAR; INTRAVENOUS; SUBCUTANEOUS at 09:00

## 2023-11-13 RX ADMIN — HEPARIN SODIUM 1700 UNIT(S)/HR: 5000 INJECTION INTRAVENOUS; SUBCUTANEOUS at 16:33

## 2023-11-13 RX ADMIN — HYDROMORPHONE HYDROCHLORIDE 4 MILLIGRAM(S): 2 INJECTION INTRAMUSCULAR; INTRAVENOUS; SUBCUTANEOUS at 15:05

## 2023-11-13 RX ADMIN — HEPARIN SODIUM 1700 UNIT(S)/HR: 5000 INJECTION INTRAVENOUS; SUBCUTANEOUS at 09:24

## 2023-11-14 LAB
APTT BLD: 69.4 SEC — HIGH (ref 24.5–35.6)
APTT BLD: 69.4 SEC — HIGH (ref 24.5–35.6)
HCT VFR BLD CALC: 39.8 % — SIGNIFICANT CHANGE UP (ref 39–50)
HCT VFR BLD CALC: 39.8 % — SIGNIFICANT CHANGE UP (ref 39–50)
HGB BLD-MCNC: 12.9 G/DL — LOW (ref 13–17)
HGB BLD-MCNC: 12.9 G/DL — LOW (ref 13–17)
INR BLD: 1 RATIO — SIGNIFICANT CHANGE UP (ref 0.85–1.18)
INR BLD: 1 RATIO — SIGNIFICANT CHANGE UP (ref 0.85–1.18)
MCHC RBC-ENTMCNC: 30 PG — SIGNIFICANT CHANGE UP (ref 27–34)
MCHC RBC-ENTMCNC: 30 PG — SIGNIFICANT CHANGE UP (ref 27–34)
MCHC RBC-ENTMCNC: 32.4 GM/DL — SIGNIFICANT CHANGE UP (ref 32–36)
MCHC RBC-ENTMCNC: 32.4 GM/DL — SIGNIFICANT CHANGE UP (ref 32–36)
MCV RBC AUTO: 92.6 FL — SIGNIFICANT CHANGE UP (ref 80–100)
MCV RBC AUTO: 92.6 FL — SIGNIFICANT CHANGE UP (ref 80–100)
NRBC # BLD: 0 /100 WBCS — SIGNIFICANT CHANGE UP (ref 0–0)
NRBC # BLD: 0 /100 WBCS — SIGNIFICANT CHANGE UP (ref 0–0)
PLATELET # BLD AUTO: 344 K/UL — SIGNIFICANT CHANGE UP (ref 150–400)
PLATELET # BLD AUTO: 344 K/UL — SIGNIFICANT CHANGE UP (ref 150–400)
PROTHROM AB SERPL-ACNC: 11.7 SEC — SIGNIFICANT CHANGE UP (ref 9.5–13)
PROTHROM AB SERPL-ACNC: 11.7 SEC — SIGNIFICANT CHANGE UP (ref 9.5–13)
RBC # BLD: 4.3 M/UL — SIGNIFICANT CHANGE UP (ref 4.2–5.8)
RBC # BLD: 4.3 M/UL — SIGNIFICANT CHANGE UP (ref 4.2–5.8)
RBC # FLD: 14.8 % — HIGH (ref 10.3–14.5)
RBC # FLD: 14.8 % — HIGH (ref 10.3–14.5)
WBC # BLD: 6.32 K/UL — SIGNIFICANT CHANGE UP (ref 3.8–10.5)
WBC # BLD: 6.32 K/UL — SIGNIFICANT CHANGE UP (ref 3.8–10.5)
WBC # FLD AUTO: 6.32 K/UL — SIGNIFICANT CHANGE UP (ref 3.8–10.5)
WBC # FLD AUTO: 6.32 K/UL — SIGNIFICANT CHANGE UP (ref 3.8–10.5)

## 2023-11-14 RX ADMIN — Medication 3 MILLILITER(S): at 21:07

## 2023-11-14 RX ADMIN — Medication 81 MILLIGRAM(S): at 11:13

## 2023-11-14 RX ADMIN — Medication 100 MILLIGRAM(S): at 21:36

## 2023-11-14 RX ADMIN — HYDROMORPHONE HYDROCHLORIDE 4 MILLIGRAM(S): 2 INJECTION INTRAMUSCULAR; INTRAVENOUS; SUBCUTANEOUS at 12:13

## 2023-11-14 RX ADMIN — Medication 3 MILLILITER(S): at 02:04

## 2023-11-14 RX ADMIN — HEPARIN SODIUM 1700 UNIT(S)/HR: 5000 INJECTION INTRAVENOUS; SUBCUTANEOUS at 18:47

## 2023-11-14 RX ADMIN — HEPARIN SODIUM 1700 UNIT(S)/HR: 5000 INJECTION INTRAVENOUS; SUBCUTANEOUS at 18:04

## 2023-11-14 RX ADMIN — HEPARIN SODIUM 1700 UNIT(S)/HR: 5000 INJECTION INTRAVENOUS; SUBCUTANEOUS at 07:36

## 2023-11-14 RX ADMIN — Medication 4 MILLIGRAM(S): at 11:13

## 2023-11-14 RX ADMIN — Medication 100 MILLIGRAM(S): at 06:19

## 2023-11-14 RX ADMIN — Medication 20 MILLIGRAM(S): at 06:19

## 2023-11-14 RX ADMIN — HEPARIN SODIUM 1700 UNIT(S)/HR: 5000 INJECTION INTRAVENOUS; SUBCUTANEOUS at 19:12

## 2023-11-14 RX ADMIN — HYDROMORPHONE HYDROCHLORIDE 4 MILLIGRAM(S): 2 INJECTION INTRAMUSCULAR; INTRAVENOUS; SUBCUTANEOUS at 11:13

## 2023-11-14 RX ADMIN — Medication 4 MILLIGRAM(S): at 17:42

## 2023-11-14 RX ADMIN — Medication 100 MILLIGRAM(S): at 13:26

## 2023-11-14 RX ADMIN — Medication 1 APPLICATION(S): at 11:13

## 2023-11-14 RX ADMIN — Medication 3 MILLILITER(S): at 07:30

## 2023-11-14 RX ADMIN — HYDROMORPHONE HYDROCHLORIDE 4 MILLIGRAM(S): 2 INJECTION INTRAMUSCULAR; INTRAVENOUS; SUBCUTANEOUS at 19:59

## 2023-11-14 RX ADMIN — Medication 3 MILLILITER(S): at 14:30

## 2023-11-14 NOTE — CARE COORDINATION ASSESSMENT. - NSCAREPROVIDERS_GEN_ALL_CORE_FT
CARE PROVIDERS:  Accepting Physician: Amol Youngblood  Administration: Lorenzo Richardson  Admitting: Amol Youngblood  Attending: Max Clarke  Clinical Doc. Improvement: Linda De La Vega  Consultant: Chiquis Zelaya  Consultant: Lary Velasquez  Consultant: Tony Acosta  Consultant: Anthony Gonzalez  Covering Team: Usman Rendon  Covering Team: Venkata Araujo  ED Attending: Johnson Small  ED Nurse: Sheridan Garcia  Nurse: Sara Guadalupe  Nurse: Maritza Montes De Oca  Nurse: Brenna Zendejas  Nurse: Michael Veras  Ordered: ServiceAccount, SCMMLM  Ordered: ADM, User  Outpatient Provider: Larisa Padgett  Outpatient Provider: Natanael Baltazar  Outpatient Provider: Max Clarke  Override: Sara Guadalupe  PCA/Nursing Assistant: Mahsa Wells  Primary Team: Ariadna Isaac  Registered Dietitian: Anastasiya Betancourt  Respiratory Therapy: James Hein  : Vania Olvera  Student: Madiha Cunningham

## 2023-11-14 NOTE — CARE COORDINATION ASSESSMENT. - OTHER PERTINENT REFERRAL INFORMATION
Spoke with patient at bedside .   Role of case management explained w verbalized understanding. Pt states he lives w his spouse in a [place]. He states he was managing well PTA. w/o a device and had no HCS. Pt currently on O2 and IVABX Spoke with patient at bedside .   Role of case management explained w verbalized understanding. Pt states he lives w his spouse in a PH on 1 level. He states he was managing well PTA. w/o a device and had no HCS. Pt currently on O2 for PE and IVABX due MSSA bacteremia. DC needs unclear at his time. CM to follow and set up needs pending hospital cource. PPt aware and receptive. Spouse to transport pt home.

## 2023-11-14 NOTE — CARE COORDINATION ASSESSMENT. - NSPASTMEDSURGHISTORY_GEN_ALL_CORE_FT
PAST MEDICAL & SURGICAL HISTORY:  BPH (benign prostatic hyperplasia)      Chronic neck pain      Degenerative disc disease, cervical      HTN (hypertension)      S/P hernia repair      T-cell lymphoma

## 2023-11-15 ENCOUNTER — TRANSCRIPTION ENCOUNTER (OUTPATIENT)
Age: 68
End: 2023-11-15

## 2023-11-15 VITALS
OXYGEN SATURATION: 92 % | RESPIRATION RATE: 17 BRPM | HEART RATE: 85 BPM | SYSTOLIC BLOOD PRESSURE: 129 MMHG | TEMPERATURE: 98 F | DIASTOLIC BLOOD PRESSURE: 76 MMHG

## 2023-11-15 LAB
APTT BLD: 81.4 SEC — HIGH (ref 24.5–35.6)
APTT BLD: 81.4 SEC — HIGH (ref 24.5–35.6)
HCT VFR BLD CALC: 40.5 % — SIGNIFICANT CHANGE UP (ref 39–50)
HCT VFR BLD CALC: 40.5 % — SIGNIFICANT CHANGE UP (ref 39–50)
HGB BLD-MCNC: 13.5 G/DL — SIGNIFICANT CHANGE UP (ref 13–17)
HGB BLD-MCNC: 13.5 G/DL — SIGNIFICANT CHANGE UP (ref 13–17)
MCHC RBC-ENTMCNC: 30.4 PG — SIGNIFICANT CHANGE UP (ref 27–34)
MCHC RBC-ENTMCNC: 30.4 PG — SIGNIFICANT CHANGE UP (ref 27–34)
MCHC RBC-ENTMCNC: 33.3 GM/DL — SIGNIFICANT CHANGE UP (ref 32–36)
MCHC RBC-ENTMCNC: 33.3 GM/DL — SIGNIFICANT CHANGE UP (ref 32–36)
MCV RBC AUTO: 91.2 FL — SIGNIFICANT CHANGE UP (ref 80–100)
MCV RBC AUTO: 91.2 FL — SIGNIFICANT CHANGE UP (ref 80–100)
NRBC # BLD: 0 /100 WBCS — SIGNIFICANT CHANGE UP (ref 0–0)
NRBC # BLD: 0 /100 WBCS — SIGNIFICANT CHANGE UP (ref 0–0)
PLATELET # BLD AUTO: 342 K/UL — SIGNIFICANT CHANGE UP (ref 150–400)
PLATELET # BLD AUTO: 342 K/UL — SIGNIFICANT CHANGE UP (ref 150–400)
RBC # BLD: 4.44 M/UL — SIGNIFICANT CHANGE UP (ref 4.2–5.8)
RBC # BLD: 4.44 M/UL — SIGNIFICANT CHANGE UP (ref 4.2–5.8)
RBC # FLD: 14.5 % — SIGNIFICANT CHANGE UP (ref 10.3–14.5)
RBC # FLD: 14.5 % — SIGNIFICANT CHANGE UP (ref 10.3–14.5)
WBC # BLD: 9.04 K/UL — SIGNIFICANT CHANGE UP (ref 3.8–10.5)
WBC # BLD: 9.04 K/UL — SIGNIFICANT CHANGE UP (ref 3.8–10.5)
WBC # FLD AUTO: 9.04 K/UL — SIGNIFICANT CHANGE UP (ref 3.8–10.5)
WBC # FLD AUTO: 9.04 K/UL — SIGNIFICANT CHANGE UP (ref 3.8–10.5)

## 2023-11-15 PROCEDURE — 36573 INSJ PICC RS&I 5 YR+: CPT

## 2023-11-15 PROCEDURE — 85610 PROTHROMBIN TIME: CPT

## 2023-11-15 PROCEDURE — 85027 COMPLETE CBC AUTOMATED: CPT

## 2023-11-15 PROCEDURE — 87040 BLOOD CULTURE FOR BACTERIA: CPT

## 2023-11-15 PROCEDURE — 93005 ELECTROCARDIOGRAM TRACING: CPT

## 2023-11-15 PROCEDURE — 99291 CRITICAL CARE FIRST HOUR: CPT

## 2023-11-15 PROCEDURE — 80053 COMPREHEN METABOLIC PANEL: CPT

## 2023-11-15 PROCEDURE — 81001 URINALYSIS AUTO W/SCOPE: CPT

## 2023-11-15 PROCEDURE — 71045 X-RAY EXAM CHEST 1 VIEW: CPT

## 2023-11-15 PROCEDURE — 86140 C-REACTIVE PROTEIN: CPT

## 2023-11-15 PROCEDURE — 71275 CT ANGIOGRAPHY CHEST: CPT

## 2023-11-15 PROCEDURE — 83605 ASSAY OF LACTIC ACID: CPT

## 2023-11-15 PROCEDURE — 80048 BASIC METABOLIC PNL TOTAL CA: CPT

## 2023-11-15 PROCEDURE — 36415 COLL VENOUS BLD VENIPUNCTURE: CPT

## 2023-11-15 PROCEDURE — 82550 ASSAY OF CK (CPK): CPT

## 2023-11-15 PROCEDURE — 76937 US GUIDE VASCULAR ACCESS: CPT

## 2023-11-15 PROCEDURE — 83880 ASSAY OF NATRIURETIC PEPTIDE: CPT

## 2023-11-15 PROCEDURE — 94760 N-INVAS EAR/PLS OXIMETRY 1: CPT

## 2023-11-15 PROCEDURE — C1751: CPT

## 2023-11-15 PROCEDURE — 84145 PROCALCITONIN (PCT): CPT

## 2023-11-15 PROCEDURE — 87150 DNA/RNA AMPLIFIED PROBE: CPT

## 2023-11-15 PROCEDURE — 94640 AIRWAY INHALATION TREATMENT: CPT

## 2023-11-15 PROCEDURE — 93970 EXTREMITY STUDY: CPT

## 2023-11-15 PROCEDURE — 87186 SC STD MICRODIL/AGAR DIL: CPT

## 2023-11-15 PROCEDURE — 85730 THROMBOPLASTIN TIME PARTIAL: CPT

## 2023-11-15 PROCEDURE — 87086 URINE CULTURE/COLONY COUNT: CPT

## 2023-11-15 PROCEDURE — 96374 THER/PROPH/DIAG INJ IV PUSH: CPT

## 2023-11-15 PROCEDURE — 84484 ASSAY OF TROPONIN QUANT: CPT

## 2023-11-15 PROCEDURE — 85025 COMPLETE CBC W/AUTO DIFF WBC: CPT

## 2023-11-15 PROCEDURE — 83036 HEMOGLOBIN GLYCOSYLATED A1C: CPT

## 2023-11-15 PROCEDURE — 82803 BLOOD GASES ANY COMBINATION: CPT

## 2023-11-15 PROCEDURE — 87637 SARSCOV2&INF A&B&RSV AMP PRB: CPT

## 2023-11-15 PROCEDURE — 93306 TTE W/DOPPLER COMPLETE: CPT

## 2023-11-15 PROCEDURE — 85379 FIBRIN DEGRADATION QUANT: CPT

## 2023-11-15 PROCEDURE — 80061 LIPID PANEL: CPT

## 2023-11-15 PROCEDURE — 82553 CREATINE MB FRACTION: CPT

## 2023-11-15 RX ORDER — APIXABAN 2.5 MG/1
10 TABLET, FILM COATED ORAL ONCE
Refills: 0 | Status: COMPLETED | OUTPATIENT
Start: 2023-11-15 | End: 2023-11-15

## 2023-11-15 RX ORDER — LOSARTAN/HYDROCHLOROTHIAZIDE 100MG-25MG
0 TABLET ORAL
Qty: 0 | Refills: 1 | DISCHARGE

## 2023-11-15 RX ORDER — CEFTRIAXONE 500 MG/1
2000 INJECTION, POWDER, FOR SOLUTION INTRAMUSCULAR; INTRAVENOUS EVERY 24 HOURS
Refills: 0 | Status: DISCONTINUED | OUTPATIENT
Start: 2023-11-15 | End: 2023-11-15

## 2023-11-15 RX ORDER — NICOTINE POLACRILEX 2 MG
1 GUM BUCCAL
Qty: 6 | Refills: 0
Start: 2023-11-15 | End: 2023-11-29

## 2023-11-15 RX ORDER — CEFTRIAXONE 500 MG/1
2 INJECTION, POWDER, FOR SOLUTION INTRAMUSCULAR; INTRAVENOUS
Qty: 0 | Refills: 0 | DISCHARGE
Start: 2023-11-15 | End: 2023-12-09

## 2023-11-15 RX ORDER — APIXABAN 2.5 MG/1
2 TABLET, FILM COATED ORAL
Qty: 28 | Refills: 0
Start: 2023-11-15 | End: 2023-11-21

## 2023-11-15 RX ORDER — APIXABAN 2.5 MG/1
1 TABLET, FILM COATED ORAL
Qty: 60 | Refills: 0
Start: 2023-11-15 | End: 2023-12-14

## 2023-11-15 RX ADMIN — CEFTRIAXONE 100 MILLIGRAM(S): 500 INJECTION, POWDER, FOR SOLUTION INTRAMUSCULAR; INTRAVENOUS at 14:24

## 2023-11-15 RX ADMIN — HEPARIN SODIUM 1700 UNIT(S)/HR: 5000 INJECTION INTRAVENOUS; SUBCUTANEOUS at 11:53

## 2023-11-15 RX ADMIN — HEPARIN SODIUM 1700 UNIT(S)/HR: 5000 INJECTION INTRAVENOUS; SUBCUTANEOUS at 07:49

## 2023-11-15 RX ADMIN — Medication 4 MILLIGRAM(S): at 09:42

## 2023-11-15 RX ADMIN — APIXABAN 10 MILLIGRAM(S): 2.5 TABLET, FILM COATED ORAL at 16:32

## 2023-11-15 RX ADMIN — Medication 20 MILLIGRAM(S): at 05:27

## 2023-11-15 RX ADMIN — HYDROMORPHONE HYDROCHLORIDE 4 MILLIGRAM(S): 2 INJECTION INTRAMUSCULAR; INTRAVENOUS; SUBCUTANEOUS at 10:43

## 2023-11-15 RX ADMIN — Medication 3 MILLILITER(S): at 08:04

## 2023-11-15 RX ADMIN — HEPARIN SODIUM 1700 UNIT(S)/HR: 5000 INJECTION INTRAVENOUS; SUBCUTANEOUS at 06:29

## 2023-11-15 RX ADMIN — HYDROMORPHONE HYDROCHLORIDE 4 MILLIGRAM(S): 2 INJECTION INTRAMUSCULAR; INTRAVENOUS; SUBCUTANEOUS at 09:43

## 2023-11-15 RX ADMIN — Medication 3 MILLILITER(S): at 01:32

## 2023-11-15 RX ADMIN — Medication 81 MILLIGRAM(S): at 14:22

## 2023-11-15 RX ADMIN — Medication 100 MILLIGRAM(S): at 05:27

## 2023-11-15 NOTE — PROCEDURE NOTE - PROCEDURE FINDINGS AND DETAILS
__12___cm bard power midline inserted into patent right basilic vein under sterile conditions and ultrasound guidance.  Midline catheter can be accessed.

## 2023-11-15 NOTE — PROGRESS NOTE ADULT - ASSESSMENT
68y male with PMHx of Sezary Syndrome (T-Cell Lymphoma), COPD, tobacco use, HTN, anxiety admitted with PE  echo noted  pulm and cardio following  continue heparin with transition to DOAC    MSSA bacteremia  no evidence of sepsis  repeat bcx  ID following  abx  midline when repeat bld cx clear  skin lesions likely source
68y male with PMHx of Sezary Syndrome (T-Cell Lymphoma), COPD, tobacco use, HTN, anxiety presented to the ER for shortness of breath for the past 24 hours. Pt states he has been experiencing shortness of breath when laying down for the last few days, which resolved with sitting up. However, last night pt states when laying down he became short of breath with "tightness" in his airway that persistent for 30 minutes    skin ca  PE  copd  smoker  htn  OP  OA  Anxiety    LE doppler with DVT  VS noted  on tele monitor  on AC for PE  TTE reviewed - RV grossly int    echo - le doppler - noted - reviewed -   tele monitoring  pt follows with steph chen in the community - dr armstrong - [facility]  hep gtt - with eventual doac transition  bronchodilators - steroids low dose  fio2 titration  goal sat > 88 pct  RV assessment  monitor VS and HD and Sat  smoking cess ed  nicotine gum - pt request - ordered  VBG  education - counseling -   PFT as outpatient
Patient is a 68 year old male with PMH of Sezary Syndrome (T-Cell Lymphoma), COPD, tobacco use, HTN, anxiety presented to the ER for shortness of breath for the past 24 hours.  Patient was admitted for PE and DVT and now found to have MSSA bacteremia.     MSSA bacteremia likely due to skin source   - has multiple skin lesions due to Sezary syndrome, not look acutely infected but likely portal of entry   - has been afebrile, no leukocytosis   - 11/10 Bcx MSSA   - 11/12 BCx NGTD   - TTE with no vegetations noted; has normal LVEF, mild pulmonary htn, RA dilated in size     PE/DVT - on heparin drip    - CTA with b/l PE; LE duplex with acute RLE DVT   - Pulmonary and Cardiology following     Recommendations:   Follow repeat Bcx from 11/12 -- if neg x72H will place midline  --midline ordered for tomorrow  Continue on cefazolin 2g IV Q8h for now  Monitor temps/WBC  Continue rest of care per primary team     D/w Dr. Clarke    Infectious Diseases will continue to follow. Please call with any questions.   Lary Velasquez M.D.  [facility] Division of Infectious Diseases [phone]  For after 5 P.M. and weekends, please call [phone]  
The patient is a 68 year old male with a history of HTN, COPD, cutaneous T cell lymphoma who presents with shortness of breath in the setting of acute PE, COPD.    Plan:  - ECG with no evidence of ischemia or infarction  - Troponin elevated at 947 in the setting of acute pulmonary embolism  - BNP 1145  - CTA chest with bilateral PE  - LE venous duplex positive for right leg DVT  - Echo with normal LV systolic function, mildly reduced RV function, mild pulm HTN  - Continue heparin drip and transition to apixaban when ok with pulmonary  - Pulm follow-up
The patient is a 68 year old male with a history of HTN, COPD, cutaneous T cell lymphoma who presents with shortness of breath in the setting of acute PE, COPD.    Plan:  - ECG with no evidence of ischemia or infarction  - Troponin elevated at 947 in the setting of acute pulmonary embolism  - BNP 1145  - CTA chest with bilateral PE  - LE venous duplex positive for right leg DVT  - Echo with normal LV systolic function, mildly reduced RV function, mild pulm HTN  - Transition to apixaban 10 mg bid for 1 week then 5 mg bid when no further procedures planned  - MSSA bacteremia - on initial blood cultures. Repeat NGTD. On cefazolin. No obvious vegetations on LETICIA. ID follow-up.
68y male with PMHx of Sezary Syndrome (T-Cell Lymphoma), COPD, tobacco use, HTN, anxiety admitted with PE  echo noted  pulm and cardio following  continue heparin with transition to DOAC    MSSA bacteremia  no evidence of sepsis  repeat bcx  ID following  ancef  skin lesions likely source
68y male with PMHx of Sezary Syndrome (T-Cell Lymphoma), COPD, tobacco use, HTN, anxiety admitted with PE  echo pending  pulm and cardio following  continue heparin with transition to DOAC  discussed with pt and wife at length  some evidence of strain with + trop and BNP  discussed need for hematology followup    MSSA bacteremia  no evidence of sepsis  repeat bcx  ID following  ancef  skin lesions likely source
68y male with PMHx of Sezary Syndrome (T-Cell Lymphoma), COPD, tobacco use, HTN, anxiety presented to the ER for shortness of breath for the past 24 hours. Pt states he has been experiencing shortness of breath when laying down for the last few days, which resolved with sitting up. However, last night pt states when laying down he became short of breath with "tightness" in his airway that persistent for 30 minutes    skin ca  PE  copd  smoker  htn  OP  OA  Anxiety    LE doppler with DVT  VS noted  on tele monitor  on HEP ac      echo - le doppler -   tele monitoring  pt follows with steph chen in the community - dr armstrong - [facility]  hep gtt - with eventual doac transition  bronchodilators - steroids low dose  fio2 titration  goal sat > 88 pct  RV assessment  monitor VS and HD and Sat  smoking cess ed  nicotine gum - pt request - ordered  VBG  education - counseling -   PFT as outpatient
Patient is a 68 year old male with PMH of Sezary Syndrome (T-Cell Lymphoma), COPD, tobacco use, HTN, anxiety presented to the ER for shortness of breath for the past 24 hours.  Patient was admitted for PE and DVT and now found to have MSSA bacteremia.     MSSA bacteremia likely due to skin source   - has multiple skin lesions due to Sezary syndrome, not look acutely infected but likely portal of entry   - has been afebrile, no leukocytosis   - 11/10 Bcx MSSA   - 11/12 BCx NGTD   - TTE with no vegetations noted; has normal LVEF, mild pulmonary htn, RA dilated in size     PE/DVT - on heparin drip    - CTA with b/l PE; LE duplex with acute RLE DVT   - Pulmonary and Cardiology following     Recommendations:   Follow repeat Bcx from 11/12 -- if neg x72H will place midline  Continue on cefazolin 2g IV Q8h  Monitor temps/WBC  Continue rest of care per primary team     Infectious Diseases will continue to follow. Please call with any questions.   Lary Velasquez M.D.  [facility] Division of Infectious Diseases [phone]  For after 5 P.M. and weekends, please call [phone]  
The patient is a 68 year old male with a history of HTN, COPD, cutaneous T cell lymphoma who presents with shortness of breath in the setting of acute PE, COPD.    Plan:  - ECG with no evidence of ischemia or infarction  - Troponin elevated at 947 in the setting of acute pulmonary embolism  - BNP 1145  - CTA chest with bilateral PE  - LE venous duplex positive for right leg DVT  - Echo with normal LV systolic function, mildly reduced RV function, mild pulm HTN  - Continue heparin drip and transition to apixaban when able  - MSSA bacteremia - on initial blood cultures. Repeat pending. On cefazolin. No obvious vegetations on LETICIA. ID follow-up.
The patient is a 68 year old male with a history of HTN, COPD, cutaneous T cell lymphoma who presents with shortness of breath in the setting of acute PE, COPD.    Plan:  - ECG with no evidence of ischemia or infarction  - Troponin elevated at 947 in the setting of acute pulmonary embolism  - BNP 1145  - CTA chest with bilateral PE  - LE venous duplex positive for right leg DVT  - Echo with normal LV systolic function, mildly reduced RV function, mild pulm HTN  - Transition to apixaban 10 mg bid for 1 week then 5 mg bid after midline placed  - MSSA bacteremia - on initial blood cultures. Repeat NGTD. On cefazolin. No obvious vegetations on LETICIA. ID follow-up.  - Pulm follow-up - eval need for home O2
Patient is a 68 year old male with PMH of Sezary Syndrome (T-Cell Lymphoma), COPD, tobacco use, HTN, anxiety presented to the ER for shortness of breath for the past 24 hours.  Patient was admitted for PE and DVT and now found to have MSSA bacteremia.     MSSA bacteremia likely due to skin source   - has multiple skin lesions due to Sezary syndrome, not look acutely infected but likely portal of entry   - has been afebrile, no leukocytosis   - 11/10 Bcx MSSA   - 11/12 BCx NGTD   - TTE with no vegetations noted; has normal LVEF, mild pulmonary htn, RA dilated in size     PE/DVT - on heparin drip    - CTA with b/l PE; LE duplex with acute RLE DVT   - Pulmonary and Cardiology following     Recommendations:   Place midline  Switch to ceftriaxone 2gm q24h  --plan x4wk course until 12/9/23, will need weekly CMP, CBC faxed to Dr. Velasquez [phone]  Monitor temps/WBC  Continue rest of care per primary team     Stable fro ID standpoint  D/c planning per primary team    D/w Dr. Clarke    Infectious Diseases will continue to follow. Please call with any questions.   Lary Velasquez M.D.  [facility] Division of Infectious Diseases [phone]  For after 5 P.M. and weekends, please call [phone]  
68y male with PMHx of Sezary Syndrome (T-Cell Lymphoma), COPD, tobacco use, HTN, anxiety admitted with PE  echo noted  pulm and cardio following  continue heparin with transition to eliquis after midline placed    MSSA bacteremia  no evidence of sepsis  repeat bcx  ID following  abx  midline tomorrow  skin lesions likely source  4 weeks iv ctx dc tomorrow after midline
68y male with PMHx of Sezary Syndrome (T-Cell Lymphoma), COPD, tobacco use, HTN, anxiety presented to the ER for shortness of breath for the past 24 hours. Pt states he has been experiencing shortness of breath when laying down for the last few days, which resolved with sitting up. However, last night pt states when laying down he became short of breath with "tightness" in his airway that persistent for 30 minutes    skin ca  PE  copd  smoker  htn  OP  OA  Anxiety    LE doppler with DVT  VS noted  on tele monitor  on AC for PE  TTE reviewed - RV grossly int  plan for Midline - ABX - ID f/u    echo - le doppler - noted - reviewed -   tele monitoring  pt follows with steph chen in the community - dr armstrong - [facility]  hep gtt - with eventual doac transition  bronchodilators - steroids low dose  fio2 titration  goal sat > 88 pct  RV assessment  monitor VS and HD and Sat  smoking cess ed  nicotine gum - pt request - ordered  VBG  education - counseling -   PFT as outpatient
68y male with PMHx of Sezary Syndrome (T-Cell Lymphoma), COPD, tobacco use, HTN, anxiety presented to the ER for shortness of breath for the past 24 hours. Pt states he has been experiencing shortness of breath when laying down for the last few days, which resolved with sitting up. However, last night pt states when laying down he became short of breath with "tightness" in his airway that persistent for 30 minutes    skin ca  PE  copd  smoker  htn  OP  OA  Anxiety    LE doppler with DVT  VS noted  on tele monitor  on HEP ac  TTE reviewed - RV grossly int    echo - le doppler - noted - reviewed -   tele monitoring  pt follows with steph chen in the community - dr armstrong - [facility]  hep gtt - with eventual doac transition  bronchodilators - steroids low dose  fio2 titration  goal sat > 88 pct  RV assessment  monitor VS and HD and Sat  smoking cess ed  nicotine gum - pt request - ordered  VBG  education - counseling -   PFT as outpatient

## 2023-11-15 NOTE — DISCHARGE NOTE PROVIDER - NSDCCPCAREPLAN_GEN_ALL_CORE_FT
PRINCIPAL DISCHARGE DIAGNOSIS  Diagnosis: Shortness of breath  Assessment and Plan of Treatment: admitted with PE  started on heparin with transition to eliquis upon discharge  eliquis 10mg bid for 1 week and later 5mg bid      SECONDARY DISCHARGE DIAGNOSES  Diagnosis: COPD exacerbation  Assessment and Plan of Treatment:     Diagnosis: Elevated troponin  Assessment and Plan of Treatment:     Diagnosis: Bacteremia  Assessment and Plan of Treatment:   MSSA bacteremia  no evidence of sepsis  skin lesions likely source  ceftriaxzone 2g q24 total 4 wks  dce dhome with midline- outpt pcp and ID fu

## 2023-11-15 NOTE — DISCHARGE NOTE NURSING/CASE MANAGEMENT/SOCIAL WORK - PATIENT PORTAL LINK FT
You can access the FollowMyHealth Patient Portal offered by [facility] by registering at the following website: [email or web address]. By joining [facility]’s FollowMyHealth portal, you will also be able to view your health information using other applications (apps) compatible with our system.

## 2023-11-15 NOTE — DISCHARGE NOTE PROVIDER - DISCHARGE DIET
CMS/[facility]) CMS/[facility])   ,   sounds: Normal heart sounds. No murmur heard.  Pulmonary:      Effort: Pulmonary effort is normal. No respiratory distress.      Breath sounds: Normal breath sounds. No stridor. No wheezing, rhonchi or rales.   Chest:      Chest wall: No tenderness.   Abdominal:      General: Abdomen is flat. Bowel sounds are normal. There is no distension.      Palpations: Abdomen is soft. There is no mass.      Tenderness: There is no abdominal tenderness. There is no guarding or rebound.      Hernia: No hernia is present.   Musculoskeletal:         General: No swelling, tenderness, deformity or signs of injury. Normal range of motion.      Right lower leg: No edema.      Left lower leg: No edema.   Lymphadenopathy:      Cervical: No cervical adenopathy.   Skin:     General: Skin is warm and dry.      Coloration: Skin is not jaundiced or pale.      Findings: No bruising, erythema, lesion or rash.   Neurological:      General: No focal deficit present.      Mental Status: She is alert. Mental status is at baseline.      Cranial Nerves: No cranial nerve deficit.      Motor: No weakness or abnormal muscle tone.      Coordination: Coordination normal.      Gait: Gait normal.   Psychiatric:         Mood and Affect: Mood normal.         Behavior: Behavior normal.             RECENT LABS:      WBC (K/mcL)   Date Value   05/06/2022 6.8     RBC (mil/mcL)   Date Value   05/06/2022 4.56     HGB (g/dL)   Date Value   05/06/2022 14.3     HCT (%)   Date Value   05/06/2022 43.6     MCV (fl)   Date Value   05/06/2022 95.6     MCH (pg)   Date Value   05/06/2022 31.4     MCHC (g/dL)   Date Value   05/06/2022 32.8     RDW-CV (%)   Date Value   05/06/2022 14.0     PLT (K/mcL)   Date Value   05/06/2022 282       Glucose (mg/dL)   Date Value   05/06/2022 112 (H)     Sodium (mmol/L)   Date Value   05/06/2022 140     Potassium (mmol/L)   Date Value   05/06/2022 4.2     Chloride (mmol/L)   Date Value   05/06/2022 107     Carbon Dioxide (mmol/L)   Date  Value   05/06/2022 23     Calcium (mg/dL)   Date Value   05/06/2022 9.7     Creatinine (mg/dL)   Date Value   05/06/2022 0.64     Lab Results   Component Value Date    BUN 17 05/06/2022     No results found for: GFRNA  No results found for: GFRA    GOT/AST (Units/L)   Date Value   05/06/2022 17     GPT/ALT (Units/L)   Date Value   05/06/2022 34     Albumin (g/dL)   Date Value   05/06/2022 4.1     Alkaline Phosphatase (Units/L)   Date Value   05/06/2022 108     Bilirubin, Total (mg/dL)   Date Value   05/06/2022 0.5           No results found for: BNP    Lab Results   Component Value Date    HGBA1C 6.9 (H) 05/06/2022      Lab Results   Component Value Date    CHOLESTEROL 192 05/06/2022     Lab Results   Component Value Date    CALCLDL 79 05/06/2022      Lab Results   Component Value Date    HDL 50 05/06/2022     Lab Results   Component Value Date    TRIGLYCERIDE 316 (H) 05/06/2022     Lab Results   Component Value Date    TSH 2.438 05/06/2022     No results found for: T4FREE   No results found for: T3FREE     No results found for: MALBCR     No results found for: INR  No results found for: PT  No results found for: PTT           Assessment   Problem List Items Addressed This Visit     Essential hypertension    Relevant Medications    lisinopril (ZESTRIL) 20 MG tablet    atorvastatin (LIPITOR) 40 MG tablet    aspirin 81 MG EC tablet    Hyperlipidemia - Primary    Relevant Medications    lisinopril (ZESTRIL) 20 MG tablet    atorvastatin (LIPITOR) 40 MG tablet    aspirin 81 MG EC tablet    Type 2 diabetes mellitus with complication, without long-term current use of insulin (CMS/[facility])      Other Visit Diagnoses     Medication monitoring encounter        Age-related osteoporosis without current pathological fracture            Hypertension is not controlled yet.  We will increase enalapril from 10 to 20 mg a day.  She will bring more data to her next appointment in 2 months.  Her data shows blood pressure one 140/85 on a  regular basis.    Dot was recommended to continue with efforts to lose weight by doing more physical activity and diet healthier.  The benefits of weight reduction in health outcomes were reviewed.    History of hyperlipidemia: Has not started statin therapy yet.  Encouraged to take statin therapy.    History of TIA in the past: Resume aspirin therapy.  Patient has been negligent about it.  No CNS symptoms at this time.    Osteoporosis diagnosed with DEXA scan.  Benefits and risk of biphosphonate therapy reviewed.  Prescription sent to the pharmacy.  Repeat DEXA scan in 3 years.        Medications were reviewed and reconciled.    Refills will be sent to the pharmacy electronically if applicable.  Preventative medicine and clinical integration measures ordered where deficient.    Schedule follow up: in 2 months        [id number] - [address] [address]  Phone: [phone] Fax: [phone]      Patient Care Team:  Matty Sorenson MD as PCP - General (Internal Medicine)    Orders Placed This Encounter   • lisinopril (ZESTRIL) 20 MG tablet   • ibandronate (BONIVA) 150 MG tablet   • calcium carbonate-vitamin D (CALTRATE+D) 600-400 MG-UNIT per tablet   • atorvastatin (LIPITOR) 40 MG tablet   • aspirin 81 MG EC tablet       I have personally reviewed the notes, labs, imaging studies and ancillary testing.  Time spent with patient and coordinating care: 30 min.    Deepak Sorenson MD  [address] [place] [id number] [phone]    Portions of this  note may have been created using the Dragon voice recognition system.  Errors in content may be related to improper recognition of the system.  Effort to review and correct the note has been made but irregularities may still be present.  Medication reconciliation was done but medications not prescribed by me may be inaccurate.    Note to patient: The  21st Century Cure Act makes medical notes like these available to patients in the interest of transparency.  However, be advised this is a medical document.  It is intended as a peer to peer communication.  It is written in medical language and may contain abbreviations or verbiage that are unfamiliar.  It may appear blunt or direct.  Medical documents are intended to carry the relevant information, facts as evident, and the clinical opinion of the practitioner.  This note may have been transcribed using a voice dictation system.  Voice recognition errors may occur.  This should not be taken to alter the content or meaning of this note.

## 2023-11-15 NOTE — DISCHARGE NOTE PROVIDER - PROVIDER TOKENS
PROVIDER:[TOKEN:[[id number]],FOLLOWUP:[2 weeks]],PROVIDER:[TOKEN:[[id number]],FOLLOWUP:[2 weeks]]

## 2023-11-15 NOTE — DISCHARGE NOTE PROVIDER - NSDCMRMEDTOKEN_GEN_ALL_CORE_FT
ALPRAZOLAM 0.25 MG TABLET: TAKE 1 TABLET BY MOUTH NIGHTLY AS NEEDED FOR SLEEP. MAX DAILY AMOUNT: 0.25 MG.  AMLODIPINE BESYLATE 5 MG TAB: TAKE 1 TABLET BY MOUTH EVERY DAY IN THE MORNING  cefTRIAXone: 2 gram(s) intravenous once a day 2 g  iv q daily  Ecotrin Adult Low Strength 81 mg oral delayed release tablet: 1 tab(s) orally once a day  Eliquis 5 mg oral tablet: 1 tab(s) orally every 12 hours to be started after 1 week of eliquis 10 twice daily  Eliquis Starter Pack for Treatment of DVT and PE 5 mg oral tablet: 2 tab(s) orally every 12 hours  hydrocortisone 2.5% topical lotion: Apply topically to affected area as needed for skin irritation  HYDROMORPHONE 4 MG TABLET: TAKE 1 TABLET BY MOUTH EVERY 3 HOURS AS NEEDED FOR MODERATE PAIN (4-6) OR SEVERE PAIN (7-10)  nicotine 4 mg oral transmucosal gum: 1 gum chewed every 6 hours  PREDNISONE 20 MG TABLET: TAKE 1 TABLET BY MOUTH EVERY DAY  triamcinolone 0.1% topical lotion: Apply topically to affected area as needed for skin irriation

## 2023-11-15 NOTE — DISCHARGE NOTE NURSING/CASE MANAGEMENT/SOCIAL WORK - NSDCPEWEB_GEN_ALL_CORE
[facility] for Tobacco Control website --- [email or web address] website --- [email or web address]

## 2023-11-15 NOTE — DISCHARGE NOTE PROVIDER - CARE PROVIDER_API CALL
Natanael Baltazar  Internal Medicine  [address] [facility]  [place] [address]  Phone: [phone]  Fax: [phone]  Follow Up Time: 2 weeks    Lary Velasquez  Infectious Disease  [address], [address]  [place] [address]  Phone: [phone]  Fax: [phone]  Follow Up Time: 2 weeks

## 2023-11-15 NOTE — DISCHARGE NOTE NURSING/CASE MANAGEMENT/SOCIAL WORK - NSDCPEEMAIL_GEN_ALL_CORE
[facility] for Tobacco Control email [email or web address]

## 2023-11-15 NOTE — PROGRESS NOTE ADULT - PROVIDER SPECIALTY LIST ADULT
Cardiology
Infectious Disease
Pulmonology
Pulmonology
Cardiology
Hospitalist
Infectious Disease
Cardiology
Cardiology
Hospitalist
Hospitalist
Pulmonology
Pulmonology
Hospitalist
Infectious Disease

## 2023-11-15 NOTE — DISCHARGE NOTE PROVIDER - HOSPITAL COURSE
68y male with PMHx of Sezary Syndrome (T-Cell Lymphoma), COPD, tobacco use, HTN, anxiety   admitted with PE  started on heparin with transition to eliquis upon discharge  eliquis 10mg bid for 1 week and later 5mg bid    MSSA bacteremia  no evidence of sepsis  skin lesions likely source  ceftriaxzone 2g q24 total 4 wks  dce dhome with midline- outpt pcp and ID fu    HTN- bp optimally controlled off home meds  restarted amlodipine  holding off on hctz-losartan- outpt fu

## 2023-11-15 NOTE — CASE MANAGEMENT PROGRESS NOTE - NSCMPROGRESSNOTE_GEN_ALL_CORE
Discussed patient with Dr Padgett and plan for DC home today. Patient requires LT home antibiotic infusion of Ceftriaxone daily which is arranged with [facility] home infusion for SOC 11/16. Patient received todays antbx dose here in hospital. Midline placed. [facility] services accepted as well for VN. Patient and wife aware and agree with transition plan. Wife will transport home. CM will remain available.

## 2023-11-15 NOTE — PROGRESS NOTE ADULT - SUBJECTIVE AND OBJECTIVE BOX
Chief Complaint: Shortness of breath    Interval Events: No events overnight.    Review of Systems:  General: No fevers, chills, weight gain  Skin: No rashes, color changes  Cardiovascular: No chest pain, orthopnea  Respiratory: No shortness of breath, cough  Gastrointestinal: No nausea, abdominal pain  Genitourinary: No incontinence, pain with urination  Musculoskeletal: No pain, swelling, decreased range of motion  Neurological: No headache, weakness  Psychiatric: No depression, anxiety  Endocrine: No weight gain, increased thirst  All other systems are comprehensively negative.    Physical Exam:  Vital Signs Last 24 Hrs  T(C): 36.8 (13 Nov 2023 05:17), Max: 37.3 (12 Nov 2023 20:05)  T(F): 98.2 (13 Nov 2023 05:17), Max: 99.1 (12 Nov 2023 20:05)  HR: 80 (13 Nov 2023 05:17) (76 - 101)  BP: 117/80 (13 Nov 2023 05:17) (103/65 - 145/78)  BP(mean): --  RR: 18 (13 Nov 2023 05:17) (18 - 18)  SpO2: 97% (13 Nov 2023 05:17) (94% - 97%)  Parameters below as of 13 Nov 2023 05:17  Patient On (Oxygen Delivery Method): nasal cannula  O2 Flow (L/min): 4  General: NAD  HEENT: MMM  Neck: No JVD, no carotid bruit  Lungs: CTAB  CV: RRR, nl S1/S2, no M/R/G  Abdomen: S/NT/ND, +BS  Extremities: No LE edema, no cyanosis  Neuro: AAOx3, non-focal  Skin: No rash    Labs:    11-12    144  |  110<H>  |  22  ----------------------------<  123<H>  3.1<L>   |  28  |  1.10    Ca    8.3<L>      12 Nov 2023 04:27    TPro  7.6  /  Alb  3.3  /  TBili  1.2  /  DBili  x   /  AST  16  /  ALT  21  /  AlkPhos  151<H>  11-11                        12.5   8.36  )-----------( 329      ( 12 Nov 2023 04:27 )             38.3       ECG/Telemetry: Sinus rhythm
Patient is a 68y old  Male who presents with a chief complaint of shortness of breath (11 Nov 2023 17:21)        INTERVAL HPI/OVERNIGHT EVENTS:   no complaints  pt seen and examined         Vital Signs Last 24 Hrs  T(C): 36.4 (11 Nov 2023 11:25), Max: 37.2 (10 Nov 2023 22:26)  T(F): 97.6 (11 Nov 2023 11:25), Max: 99 (10 Nov 2023 22:26)  HR: 91 (11 Nov 2023 14:18) (89 - 116)  BP: 112/77 (11 Nov 2023 11:25) (111/77 - 119/82)  BP(mean): --  RR: 20 (11 Nov 2023 11:25) (19 - 22)  SpO2: 93% (11 Nov 2023 14:18) (92% - 99%)    Parameters below as of 11 Nov 2023 14:18  Patient On (Oxygen Delivery Method): nasal cannula, 4L        acetaminophen     Tablet .. 650 milliGRAM(s) Oral every 6 hours PRN  albuterol    0.083% 2.5 milliGRAM(s) Nebulizer every 3 hours PRN  albuterol/ipratropium for Nebulization 3 milliLiter(s) Nebulizer every 6 hours  ALPRAZolam 0.25 milliGRAM(s) Oral at bedtime PRN  aluminum hydroxide/magnesium hydroxide/simethicone Suspension 30 milliLiter(s) Oral every 4 hours PRN  aspirin enteric coated 81 milliGRAM(s) Oral daily  ceFAZolin   IVPB 2000 milliGRAM(s) IV Intermittent every 8 hours  ceFAZolin   IVPB      heparin   Injectable 6000 Unit(s) IV Push every 6 hours PRN  heparin   Injectable 3000 Unit(s) IV Push every 6 hours PRN  heparin  Infusion.  Unit(s)/Hr IV Continuous <Continuous>  hydrocortisone 2.5% Cream 1 Application(s) Topical daily PRN  HYDROmorphone   Tablet 4 milliGRAM(s) Oral every 3 hours PRN  influenza  Vaccine (HIGH DOSE) 0.7 milliLiter(s) IntraMuscular once  melatonin 3 milliGRAM(s) Oral at bedtime PRN  nicotine  Polacrilex Gum 4 milliGRAM(s) Oral every 6 hours  ondansetron Injectable 4 milliGRAM(s) IV Push every 8 hours PRN  predniSONE   Tablet 20 milliGRAM(s) Oral daily  triamcinolone 0.1% Cream 1 Application(s) Topical every 12 hours PRN      PHYSICAL EXAM:  GENERAL: NAD   EYES: conjunctiva and sclera clear  ENMT: Moist mucous membranes  NECK: Supple, No JVD, Normal thyroid  CHEST/LUNG: non labored, cta b/l  HEART: Regular rate and rhythm; No murmurs, rubs, or gallops  ABDOMEN: Soft, Nontender, Nondistended; Bowel sounds present  EXTREMITIES:  2+ Peripheral Pulses, No clubbing, no cyanosis, no edema  LYMPH: No lymphadenopathy noted  SKIN: multiple lesions  NEURO: no focal deficits    Consultant(s) Notes Reviewed:  [x ] YES  [ ] NO  Care Discussed with Consultants/Other Providers [ x] YES  [ ] NO    LABS:                        15.0   8.31  )-----------( 338      ( 11 Nov 2023 08:57 )             46.4     11-11    142  |  106  |  16  ----------------------------<  200<H>  3.7   |  27  |  1.10    Ca    9.0      11 Nov 2023 08:57    TPro  7.6  /  Alb  3.3  /  TBili  1.2  /  DBili  x   /  AST  16  /  ALT  21  /  AlkPhos  151<H>  11-11    PT/INR - ( 10 Nov 2023 23:00 )   PT: 12.2 sec;   INR: 1.04 ratio         PTT - ( 11 Nov 2023 12:43 )  PTT:76.9 sec  Urinalysis Basic - ( 11 Nov 2023 08:57 )    Color: x / Appearance: x / SG: x / pH: x  Gluc: 200 mg/dL / Ketone: x  / Bili: x / Urobili: x   Blood: x / Protein: x / Nitrite: x   Leuk Esterase: x / RBC: x / WBC x   Sq Epi: x / Non Sq Epi: x / Bacteria: x      CAPILLARY BLOOD GLUCOSE            Urinalysis Basic - ( 11 Nov 2023 08:57 )    Color: x / Appearance: x / SG: x / pH: x  Gluc: 200 mg/dL / Ketone: x  / Bili: x / Urobili: x   Blood: x / Protein: x / Nitrite: x   Leuk Esterase: x / RBC: x / WBC x   Sq Epi: x / Non Sq Epi: x / Bacteria: x        Culture - Blood (collected 10 Nov 2023 23:15)  Source: .Blood Blood-Peripheral  Gram Stain (11 Nov 2023 17:11):    Growth in aerobic bottle: Gram Positive Cocci in Clusters    Growth in anaerobic bottle: Gram Positive Cocci in Clusters  Preliminary Report (11 Nov 2023 17:11):    Growth in aerobic bottle: Gram Positive Cocci in Clusters    Direct identification is available within approximately 3-5    hours either by Blood Panel Multiplexed PCR or Direct    MALDI-TOF. Details: [email or web address]    Growth in anaerobic bottle: Gram Positive Cocci in Clusters  Organism: Blood Culture PCR (11 Nov 2023 16:24)  Organism: Blood Culture PCR (11 Nov 2023 16:24)    Culture - Blood (collected 10 Nov 2023 23:00)  Source: .Blood Blood-Peripheral  Gram Stain (11 Nov 2023 17:03):    Growth in aerobic and anaerobic bottles: Gram Positive Cocci in Clusters  Preliminary Report (11 Nov 2023 17:03):    Growth in aerobic and anaerobic bottles: Gram Positive Cocci in Clusters        RADIOLOGY & ADDITIONAL TESTS:    Imaging Personally Reviewed  Reviewed consultants input
Patient is a 68y old  Male who presents with a chief complaint of shortness of breath (14 Nov 2023 14:30)        INTERVAL HPI/OVERNIGHT EVENTS:   no complaints  pt seen and examined         Vital Signs Last 24 Hrs  T(C): 36.8 (14 Nov 2023 15:00), Max: 36.9 (13 Nov 2023 20:24)  T(F): 98.2 (14 Nov 2023 15:00), Max: 98.4 (13 Nov 2023 20:24)  HR: 86 (14 Nov 2023 15:00) (78 - 94)  BP: 122/76 (14 Nov 2023 15:00) (122/76 - 129/76)  BP(mean): --  RR: 18 (14 Nov 2023 15:00) (18 - 18)  SpO2: 96% (14 Nov 2023 15:00) (94% - 96%)    Parameters below as of 14 Nov 2023 15:00  Patient On (Oxygen Delivery Method): nasal cannula        acetaminophen     Tablet .. 650 milliGRAM(s) Oral every 6 hours PRN  albuterol    0.083% 2.5 milliGRAM(s) Nebulizer every 3 hours PRN  albuterol/ipratropium for Nebulization 3 milliLiter(s) Nebulizer every 6 hours  ALPRAZolam 0.25 milliGRAM(s) Oral at bedtime PRN  aluminum hydroxide/magnesium hydroxide/simethicone Suspension 30 milliLiter(s) Oral every 4 hours PRN  aspirin enteric coated 81 milliGRAM(s) Oral daily  ceFAZolin   IVPB      ceFAZolin   IVPB 2000 milliGRAM(s) IV Intermittent every 8 hours  heparin   Injectable 6000 Unit(s) IV Push every 6 hours PRN  heparin   Injectable 3000 Unit(s) IV Push every 6 hours PRN  heparin  Infusion.  Unit(s)/Hr IV Continuous <Continuous>  hydrocortisone 2.5% Cream 1 Application(s) Topical daily PRN  HYDROmorphone   Tablet 4 milliGRAM(s) Oral every 3 hours PRN  influenza  Vaccine (HIGH DOSE) 0.7 milliLiter(s) IntraMuscular once  melatonin 3 milliGRAM(s) Oral at bedtime PRN  nicotine  Polacrilex Gum 4 milliGRAM(s) Oral every 6 hours  ondansetron Injectable 4 milliGRAM(s) IV Push every 8 hours PRN  predniSONE   Tablet 20 milliGRAM(s) Oral daily  triamcinolone 0.1% Cream 1 Application(s) Topical every 12 hours PRN      PHYSICAL EXAM:  GENERAL: NAD   EYES: conjunctiva and sclera clear  ENMT: Moist mucous membranes  NECK: Supple, No JVD, Normal thyroid  CHEST/LUNG: non labored, cta b/l  HEART: Regular rate and rhythm; No murmurs, rubs, or gallops  ABDOMEN: Soft, Nontender, Nondistended; Bowel sounds present  EXTREMITIES:  2+ Peripheral Pulses, No clubbing, no cyanosis, no edema  LYMPH: No lymphadenopathy noted  SKIN: No rashes or lesions  NEURO: no focal deficits    Consultant(s) Notes Reviewed:  [x ] YES  [ ] NO  Care Discussed with Consultants/Other Providers [ x] YES  [ ] NO    LABS:                        12.9   6.32  )-----------( 344      ( 14 Nov 2023 07:28 )             39.8     11-13    144  |  111<H>  |  17  ----------------------------<  110<H>  4.0   |  26  |  1.10    Ca    8.5      13 Nov 2023 07:54      PTT - ( 13 Nov 2023 22:23 )  PTT:71.6 sec  Urinalysis Basic - ( 13 Nov 2023 07:54 )    Color: x / Appearance: x / SG: x / pH: x  Gluc: 110 mg/dL / Ketone: x  / Bili: x / Urobili: x   Blood: x / Protein: x / Nitrite: x   Leuk Esterase: x / RBC: x / WBC x   Sq Epi: x / Non Sq Epi: x / Bacteria: x      CAPILLARY BLOOD GLUCOSE            Urinalysis Basic - ( 13 Nov 2023 07:54 )    Color: x / Appearance: x / SG: x / pH: x  Gluc: 110 mg/dL / Ketone: x  / Bili: x / Urobili: x   Blood: x / Protein: x / Nitrite: x   Leuk Esterase: x / RBC: x / WBC x   Sq Epi: x / Non Sq Epi: x / Bacteria: x        Culture - Blood (collected 12 Nov 2023 04:27)  Source: .Blood Blood  Preliminary Report (14 Nov 2023 12:01):    No growth at 48 Hours    Culture - Blood (collected 12 Nov 2023 04:27)  Source: .Blood Blood  Preliminary Report (14 Nov 2023 12:01):    No growth at 48 Hours        RADIOLOGY & ADDITIONAL TESTS:    Imaging Personally Reviewed  Reviewed consultants input
Chief Complaint: Shortness of breath    Interval Events: No events overnight.    Review of Systems:  General: No fevers, chills, weight gain  Skin: No rashes, color changes  Cardiovascular: No chest pain, orthopnea  Respiratory: No shortness of breath, cough  Gastrointestinal: No nausea, abdominal pain  Genitourinary: No incontinence, pain with urination  Musculoskeletal: No pain, swelling, decreased range of motion  Neurological: No headache, weakness  Psychiatric: No depression, anxiety  Endocrine: No weight gain, increased thirst  All other systems are comprehensively negative.    Physical Exam:  Vital Signs Last 24 Hrs  T(C): 36.4 (14 Nov 2023 05:37), Max: 36.9 (13 Nov 2023 20:24)  T(F): 97.5 (14 Nov 2023 05:37), Max: 98.4 (13 Nov 2023 20:24)  HR: 78 (14 Nov 2023 05:37) (78 - 94)  BP: 129/68 (14 Nov 2023 05:37) (129/68 - 133/84)  BP(mean): --  RR: 18 (14 Nov 2023 05:37) (18 - 18)  SpO2: 95% (14 Nov 2023 05:37) (94% - 97%)  Parameters below as of 14 Nov 2023 05:37  Patient On (Oxygen Delivery Method): nasal cannula  O2 Flow (L/min): 3  General: NAD  HEENT: MMM  Neck: No JVD, no carotid bruit  Lungs: CTAB  CV: RRR, nl S1/S2, no M/R/G  Abdomen: S/NT/ND, +BS  Extremities: No LE edema, no cyanosis  Neuro: AAOx3, non-focal  Skin: No rash    Labs:    11-13    144  |  111<H>  |  17  ----------------------------<  110<H>  4.0   |  26  |  1.10    Ca    8.5      13 Nov 2023 07:54                          12.2   8.60  )-----------( 323      ( 13 Nov 2023 07:54 )             38.1       ECG/Telemetry: Sinus rhythm
Date/Time Patient Seen:  		  Referring MD:   Data Reviewed	       Patient is a 68y old  Male who presents with a chief complaint of shortness of breath (12 Nov 2023 17:30)      Subjective/HPI     PAST MEDICAL & SURGICAL HISTORY:  HTN (hypertension)    Degenerative disc disease, cervical    Chronic neck pain    BPH (benign prostatic hyperplasia)    T-cell lymphoma    S/P hernia repair          Medication list         MEDICATIONS  (STANDING):  albuterol/ipratropium for Nebulization 3 milliLiter(s) Nebulizer every 6 hours  aspirin enteric coated 81 milliGRAM(s) Oral daily  ceFAZolin   IVPB 2000 milliGRAM(s) IV Intermittent every 8 hours  ceFAZolin   IVPB      heparin  Infusion.  Unit(s)/Hr (13 mL/Hr) IV Continuous <Continuous>  influenza  Vaccine (HIGH DOSE) 0.7 milliLiter(s) IntraMuscular once  nicotine  Polacrilex Gum 4 milliGRAM(s) Oral every 6 hours  predniSONE   Tablet 20 milliGRAM(s) Oral daily    MEDICATIONS  (PRN):  acetaminophen     Tablet .. 650 milliGRAM(s) Oral every 6 hours PRN Temp greater or equal to 38C (100.4F), Mild Pain (1 - 3)  albuterol    0.083% 2.5 milliGRAM(s) Nebulizer every 3 hours PRN Shortness of Breath and/or Wheezing  ALPRAZolam 0.25 milliGRAM(s) Oral at bedtime PRN Anxiety  aluminum hydroxide/magnesium hydroxide/simethicone Suspension 30 milliLiter(s) Oral every 4 hours PRN Dyspepsia  heparin   Injectable 6000 Unit(s) IV Push every 6 hours PRN For aPTT less than 40  heparin   Injectable 3000 Unit(s) IV Push every 6 hours PRN For aPTT between 40 - 57  hydrocortisone 2.5% Cream 1 Application(s) Topical daily PRN Rash and/or Itching  HYDROmorphone   Tablet 4 milliGRAM(s) Oral every 3 hours PRN Moderate Pain (4 - 6)  melatonin 3 milliGRAM(s) Oral at bedtime PRN Insomnia  ondansetron Injectable 4 milliGRAM(s) IV Push every 8 hours PRN Nausea and/or Vomiting  triamcinolone 0.1% Cream 1 Application(s) Topical every 12 hours PRN skin irritation         Vitals log        ICU Vital Signs Last 24 Hrs  T(C): 36.8 (13 Nov 2023 05:17), Max: 37.3 (12 Nov 2023 20:05)  T(F): 98.2 (13 Nov 2023 05:17), Max: 99.1 (12 Nov 2023 20:05)  HR: 80 (13 Nov 2023 05:17) (76 - 101)  BP: 117/80 (13 Nov 2023 05:17) (103/65 - 145/78)  BP(mean): --  ABP: --  ABP(mean): --  RR: 18 (13 Nov 2023 05:17) (18 - 18)  SpO2: 97% (13 Nov 2023 05:17) (94% - 97%)    O2 Parameters below as of 13 Nov 2023 05:17  Patient On (Oxygen Delivery Method): nasal cannula  O2 Flow (L/min): 4               Input and Output:  I&O's Detail    11 Nov 2023 07:01  -  12 Nov 2023 07:00  --------------------------------------------------------  IN:    Oral Fluid: 240 mL  Total IN: 240 mL    OUT:  Total OUT: 0 mL    Total NET: 240 mL      12 Nov 2023 07:01  -  13 Nov 2023 05:49  --------------------------------------------------------  IN:    Oral Fluid: 300 mL  Total IN: 300 mL    OUT:  Total OUT: 0 mL    Total NET: 300 mL          Lab Data                        12.5   8.36  )-----------( 329      ( 12 Nov 2023 04:27 )             38.3     11-12    144  |  110<H>  |  22  ----------------------------<  123<H>  3.1<L>   |  28  |  1.10    Ca    8.3<L>      12 Nov 2023 04:27    TPro  7.6  /  Alb  3.3  /  TBili  1.2  /  DBili  x   /  AST  16  /  ALT  21  /  AlkPhos  151<H>  11-11      CARDIAC MARKERS ( 11 Nov 2023 08:57 )  x     / x     / 97 U/L / x     / 4.6 ng/mL        Review of Systems	      Objective     Physical Examination  heart s1s2  lung dec BS  head nc        Pertinent Lab findings & Imaging      Jennifer:  NO   Adequate UO     I&O's Detail    11 Nov 2023 07:01  -  12 Nov 2023 07:00  --------------------------------------------------------  IN:    Oral Fluid: 240 mL  Total IN: 240 mL    OUT:  Total OUT: 0 mL    Total NET: 240 mL      12 Nov 2023 07:01  -  13 Nov 2023 05:49  --------------------------------------------------------  IN:    Oral Fluid: 300 mL  Total IN: 300 mL    OUT:  Total OUT: 0 mL    Total NET: 300 mL               Discussed with:     Cultures:	        Radiology                            
Date/Time Patient Seen:  		  Referring MD:   Data Reviewed	       Patient is a 68y old  Male who presents with a chief complaint of shortness of breath (13 Nov 2023 18:16)      Subjective/HPI     PAST MEDICAL & SURGICAL HISTORY:  HTN (hypertension)    Degenerative disc disease, cervical    Chronic neck pain    BPH (benign prostatic hyperplasia)    T-cell lymphoma    S/P hernia repair          Medication list         MEDICATIONS  (STANDING):  albuterol/ipratropium for Nebulization 3 milliLiter(s) Nebulizer every 6 hours  aspirin enteric coated 81 milliGRAM(s) Oral daily  ceFAZolin   IVPB 2000 milliGRAM(s) IV Intermittent every 8 hours  ceFAZolin   IVPB      heparin  Infusion.  Unit(s)/Hr (13 mL/Hr) IV Continuous <Continuous>  influenza  Vaccine (HIGH DOSE) 0.7 milliLiter(s) IntraMuscular once  nicotine  Polacrilex Gum 4 milliGRAM(s) Oral every 6 hours  predniSONE   Tablet 20 milliGRAM(s) Oral daily    MEDICATIONS  (PRN):  acetaminophen     Tablet .. 650 milliGRAM(s) Oral every 6 hours PRN Temp greater or equal to 38C (100.4F), Mild Pain (1 - 3)  albuterol    0.083% 2.5 milliGRAM(s) Nebulizer every 3 hours PRN Shortness of Breath and/or Wheezing  ALPRAZolam 0.25 milliGRAM(s) Oral at bedtime PRN Anxiety  aluminum hydroxide/magnesium hydroxide/simethicone Suspension 30 milliLiter(s) Oral every 4 hours PRN Dyspepsia  heparin   Injectable 6000 Unit(s) IV Push every 6 hours PRN For aPTT less than 40  heparin   Injectable 3000 Unit(s) IV Push every 6 hours PRN For aPTT between 40 - 57  hydrocortisone 2.5% Cream 1 Application(s) Topical daily PRN Rash and/or Itching  HYDROmorphone   Tablet 4 milliGRAM(s) Oral every 3 hours PRN Moderate Pain (4 - 6)  melatonin 3 milliGRAM(s) Oral at bedtime PRN Insomnia  ondansetron Injectable 4 milliGRAM(s) IV Push every 8 hours PRN Nausea and/or Vomiting  triamcinolone 0.1% Cream 1 Application(s) Topical every 12 hours PRN skin irritation         Vitals log        ICU Vital Signs Last 24 Hrs  T(C): 36.4 (14 Nov 2023 05:37), Max: 36.9 (13 Nov 2023 20:24)  T(F): 97.5 (14 Nov 2023 05:37), Max: 98.4 (13 Nov 2023 20:24)  HR: 78 (14 Nov 2023 05:37) (78 - 94)  BP: 129/68 (14 Nov 2023 05:37) (129/68 - 133/84)  BP(mean): --  ABP: --  ABP(mean): --  RR: 18 (14 Nov 2023 05:37) (18 - 18)  SpO2: 95% (14 Nov 2023 05:37) (94% - 97%)    O2 Parameters below as of 14 Nov 2023 05:37  Patient On (Oxygen Delivery Method): nasal cannula  O2 Flow (L/min): 3               Input and Output:  I&O's Detail      Lab Data                        12.2   8.60  )-----------( 323      ( 13 Nov 2023 07:54 )             38.1     11-13    144  |  111<H>  |  17  ----------------------------<  110<H>  4.0   |  26  |  1.10    Ca    8.5      13 Nov 2023 07:54              Review of Systems	      Objective     Physical Examination    heart s1s2  lung dc BS  head nc    Pertinent Lab findings & Imaging      Jennifer:  NO   Adequate UO     I&O's Detail           Discussed with:     Cultures:	        Radiology                            
Date/Time Patient Seen:  		  Referring MD:   Data Reviewed	       Patient is a 68y old  Male who presents with a chief complaint of shortness of breath (14 Nov 2023 15:51)      Subjective/HPI     PAST MEDICAL & SURGICAL HISTORY:  HTN (hypertension)    Degenerative disc disease, cervical    Chronic neck pain    BPH (benign prostatic hyperplasia)    T-cell lymphoma    S/P hernia repair          Medication list         MEDICATIONS  (STANDING):  albuterol/ipratropium for Nebulization 3 milliLiter(s) Nebulizer every 6 hours  aspirin enteric coated 81 milliGRAM(s) Oral daily  ceFAZolin   IVPB 2000 milliGRAM(s) IV Intermittent every 8 hours  ceFAZolin   IVPB      heparin  Infusion.  Unit(s)/Hr (13 mL/Hr) IV Continuous <Continuous>  influenza  Vaccine (HIGH DOSE) 0.7 milliLiter(s) IntraMuscular once  nicotine  Polacrilex Gum 4 milliGRAM(s) Oral every 6 hours  predniSONE   Tablet 20 milliGRAM(s) Oral daily    MEDICATIONS  (PRN):  acetaminophen     Tablet .. 650 milliGRAM(s) Oral every 6 hours PRN Temp greater or equal to 38C (100.4F), Mild Pain (1 - 3)  albuterol    0.083% 2.5 milliGRAM(s) Nebulizer every 3 hours PRN Shortness of Breath and/or Wheezing  ALPRAZolam 0.25 milliGRAM(s) Oral at bedtime PRN Anxiety  aluminum hydroxide/magnesium hydroxide/simethicone Suspension 30 milliLiter(s) Oral every 4 hours PRN Dyspepsia  heparin   Injectable 3000 Unit(s) IV Push every 6 hours PRN For aPTT between 40 - 57  heparin   Injectable 6000 Unit(s) IV Push every 6 hours PRN For aPTT less than 40  hydrocortisone 2.5% Cream 1 Application(s) Topical daily PRN Rash and/or Itching  HYDROmorphone   Tablet 4 milliGRAM(s) Oral every 3 hours PRN Moderate Pain (4 - 6)  melatonin 3 milliGRAM(s) Oral at bedtime PRN Insomnia  ondansetron Injectable 4 milliGRAM(s) IV Push every 8 hours PRN Nausea and/or Vomiting  triamcinolone 0.1% Cream 1 Application(s) Topical every 12 hours PRN skin irritation         Vitals log        ICU Vital Signs Last 24 Hrs  T(C): 36.6 (15 Nov 2023 04:50), Max: 37 (14 Nov 2023 20:04)  T(F): 97.9 (15 Nov 2023 04:50), Max: 98.6 (14 Nov 2023 20:04)  HR: 71 (15 Nov 2023 04:50) (71 - 94)  BP: 138/84 (15 Nov 2023 04:50) (122/76 - 138/84)  BP(mean): --  ABP: --  ABP(mean): --  RR: 18 (15 Nov 2023 04:50) (18 - 18)  SpO2: 97% (15 Nov 2023 04:50) (94% - 97%)    O2 Parameters below as of 15 Nov 2023 04:50  Patient On (Oxygen Delivery Method): nasal cannula                 Input and Output:  I&O's Detail      Lab Data                        12.9   6.32  )-----------( 344      ( 14 Nov 2023 07:28 )             39.8     11-13    144  |  111<H>  |  17  ----------------------------<  110<H>  4.0   |  26  |  1.10    Ca    8.5      13 Nov 2023 07:54              Review of Systems	      Objective     Physical Examination    heart s1s2  lung dc BS  head nc      Pertinent Lab findings & Imaging      Jennifer:  NO   Adequate UO     I&O's Detail           Discussed with:     Cultures:	        Radiology                            
Chief Complaint: Shortness of breath    Interval Events: No events overnight.    Review of Systems:  General: No fevers, chills, weight gain  Skin: No rashes, color changes  Cardiovascular: No chest pain, orthopnea  Respiratory: No shortness of breath, cough  Gastrointestinal: No nausea, abdominal pain  Genitourinary: No incontinence, pain with urination  Musculoskeletal: No pain, swelling, decreased range of motion  Neurological: No headache, weakness  Psychiatric: No depression, anxiety  Endocrine: No weight gain, increased thirst  All other systems are comprehensively negative.    Physical Exam:  Vital Signs Last 24 Hrs  T(C): 36.6 (15 Nov 2023 04:50), Max: 37 (14 Nov 2023 20:04)  T(F): 97.9 (15 Nov 2023 04:50), Max: 98.6 (14 Nov 2023 20:04)  HR: 71 (15 Nov 2023 04:50) (71 - 94)  BP: 138/84 (15 Nov 2023 04:50) (122/76 - 138/84)  BP(mean): --  RR: 18 (15 Nov 2023 04:50) (18 - 18)  SpO2: 97% (15 Nov 2023 04:50) (94% - 97%)  Parameters below as of 15 Nov 2023 04:50  Patient On (Oxygen Delivery Method): nasal cannula  General: NAD  HEENT: MMM  Neck: No JVD, no carotid bruit  Lungs: CTAB  CV: RRR, nl S1/S2, no M/R/G  Abdomen: S/NT/ND, +BS  Extremities: No LE edema, no cyanosis  Neuro: AAOx3, non-focal  Skin: No rash    Labs:    11-13    144  |  111<H>  |  17  ----------------------------<  110<H>  4.0   |  26  |  1.10    Ca    8.5      13 Nov 2023 07:54                          12.9   6.32  )-----------( 344      ( 14 Nov 2023 07:28 )             39.8     ECG/Telemetry: Sinus rhythm
Chief Complaint: Shortness of breath    Interval Events: No events overnight.    Review of Systems:  General: No fevers, chills, weight gain  Skin: No rashes, color changes  Cardiovascular: No chest pain, orthopnea  Respiratory: No shortness of breath, cough  Gastrointestinal: No nausea, abdominal pain  Genitourinary: No incontinence, pain with urination  Musculoskeletal: No pain, swelling, decreased range of motion  Neurological: No headache, weakness  Psychiatric: No depression, anxiety  Endocrine: No weight gain, increased thirst  All other systems are comprehensively negative.    Physical Exam:  Vitals:        Vital Signs Last 24 Hrs  T(C): 36.7 (12 Nov 2023 04:48), Max: 36.7 (12 Nov 2023 04:48)  T(F): 98 (12 Nov 2023 04:48), Max: 98 (12 Nov 2023 04:48)  HR: 84 (12 Nov 2023 04:48) (84 - 96)  BP: 107/70 (12 Nov 2023 04:48) (107/70 - 112/77)  BP(mean): --  RR: 19 (12 Nov 2023 04:48) (19 - 20)  SpO2: 97% (12 Nov 2023 04:48) (93% - 97%)  Parameters below as of 12 Nov 2023 04:48  Patient On (Oxygen Delivery Method): nasal cannula  O2 Flow (L/min): 4  General: NAD  HEENT: MMM  Neck: No JVD, no carotid bruit  Lungs: CTAB  CV: RRR, nl S1/S2, no M/R/G  Abdomen: S/NT/ND, +BS  Extremities: No LE edema, no cyanosis  Neuro: AAOx3, non-focal  Skin: No rash    Labs:                        12.5   8.36  )-----------( 329      ( 12 Nov 2023 04:27 )             38.3     11-12    144  |  110<H>  |  22  ----------------------------<  123<H>  3.1<L>   |  28  |  1.10    Ca    8.3<L>      12 Nov 2023 04:27    TPro  7.6  /  Alb  3.3  /  TBili  1.2  /  DBili  x   /  AST  16  /  ALT  21  /  AlkPhos  151<H>  11-11    CARDIAC MARKERS ( 11 Nov 2023 08:57 )  x     / x     / 97 U/L / x     / 4.6 ng/mL  CARDIAC MARKERS ( 11 Nov 2023 01:32 )  x     / x     / 79 U/L / x     / 3.6 ng/mL  CARDIAC MARKERS ( 10 Nov 2023 23:00 )  x     / x     / 73 U/L / x     / x          PT/INR - ( 10 Nov 2023 23:00 )   PT: 12.2 sec;   INR: 1.04 ratio         PTT - ( 12 Nov 2023 04:27 )  PTT:77.9 sec    ECG/Telemetry: Sinus rhythm
[facility], Division of Infectious Diseases  LILY Savage Y. Patel, S. Shah, G. [facility]  [phone]    Name: ZACKERY CAPPS  Age: 68y  Gender: Male  MRN: [id number]    Interval History:  Patient seen and examined at bedside  No acute overnight events. Afebrile  Notes reviewed    Antibiotics:  ceFAZolin   IVPB      ceFAZolin   IVPB 2000 milliGRAM(s) IV Intermittent every 8 hours      Medications:  acetaminophen     Tablet .. 650 milliGRAM(s) Oral every 6 hours PRN  albuterol    0.083% 2.5 milliGRAM(s) Nebulizer every 3 hours PRN  albuterol/ipratropium for Nebulization 3 milliLiter(s) Nebulizer every 6 hours  ALPRAZolam 0.25 milliGRAM(s) Oral at bedtime PRN  aluminum hydroxide/magnesium hydroxide/simethicone Suspension 30 milliLiter(s) Oral every 4 hours PRN  aspirin enteric coated 81 milliGRAM(s) Oral daily  ceFAZolin   IVPB      ceFAZolin   IVPB 2000 milliGRAM(s) IV Intermittent every 8 hours  heparin   Injectable 6000 Unit(s) IV Push every 6 hours PRN  heparin   Injectable 3000 Unit(s) IV Push every 6 hours PRN  heparin  Infusion.  Unit(s)/Hr IV Continuous <Continuous>  hydrocortisone 2.5% Cream 1 Application(s) Topical daily PRN  HYDROmorphone   Tablet 4 milliGRAM(s) Oral every 3 hours PRN  influenza  Vaccine (HIGH DOSE) 0.7 milliLiter(s) IntraMuscular once  melatonin 3 milliGRAM(s) Oral at bedtime PRN  nicotine  Polacrilex Gum 4 milliGRAM(s) Oral every 6 hours  ondansetron Injectable 4 milliGRAM(s) IV Push every 8 hours PRN  predniSONE   Tablet 20 milliGRAM(s) Oral daily  triamcinolone 0.1% Cream 1 Application(s) Topical every 12 hours PRN      Review of Systems:  A 10-point review of systems was obtained.   Review of systems otherwise negative except as previously noted.    Allergies: No Known Allergies    For details regarding the patient's past medical history, social history, family history, and other miscellaneous elements, please refer the initial infectious diseases consultation and/or the admitting history and physical examination for this admission.    Objective:  Vitals:   T(C): 36.4 (11-14-23 @ 05:37), Max: 36.9 (11-13-23 @ 20:24)  HR: 94 (11-14-23 @ 09:00) (78 - 94)  BP: 129/68 (11-14-23 @ 05:37) (129/68 - 129/76)  RR: 18 (11-14-23 @ 09:00) (18 - 18)  SpO2: 94% (11-14-23 @ 07:30) (94% - 96%)    Physical Examination:  General: no acute distress  HEENT: NC/AT, EOMI  Cardio: S1, S2 heard, RRR, no murmurs  Resp: symmetric chest rise  Abd: soft, NT, ND,   Ext: no edema or cyanosis  Skin: warm, dry, multiple lesions across body      Laboratory Studies:  CBC:                       12.9   6.32  )-----------( 344      ( 14 Nov 2023 07:28 )             39.8     CMP: 11-13    144  |  111<H>  |  17  ----------------------------<  110<H>  4.0   |  26  |  1.10    Ca    8.5      13 Nov 2023 07:54        Urinalysis Basic - ( 13 Nov 2023 07:54 )    Color: x / Appearance: x / SG: x / pH: x  Gluc: 110 mg/dL / Ketone: x  / Bili: x / Urobili: x   Blood: x / Protein: x / Nitrite: x   Leuk Esterase: x / RBC: x / WBC x   Sq Epi: x / Non Sq Epi: x / Bacteria: x        Microbiology: reviewed    Culture - Blood (collected 11-12-23 @ 04:27)  Source: .Blood Blood  Preliminary Report (11-14-23 @ 12:01):    No growth at 48 Hours    Culture - Blood (collected 11-12-23 @ 04:27)  Source: .Blood Blood  Preliminary Report (11-14-23 @ 12:01):    No growth at 48 Hours    Culture - Urine (collected 11-11-23 @ 00:44)  Source: Clean Catch Clean Catch (Midstream)  Final Report (11-11-23 @ 23:41):    <10,000 CFU/mL Normal Urogenital Cindy    Culture - Blood (collected 11-10-23 @ 23:15)  Source: .Blood Blood-Peripheral  Gram Stain (11-11-23 @ 17:11):    Growth in aerobic bottle: Gram Positive Cocci in Clusters    Growth in anaerobic bottle: Gram Positive Cocci in Clusters  Final Report (11-13-23 @ 08:39):    Growth in aerobic and anaerobic bottles: Staphylococcus aureus    Direct identification is available within approximately 3-5    hours either by Blood Panel Multiplexed PCR or Direct    MALDI-TOF. Details: [email or web address]  Organism: Blood Culture PCR  Staphylococcus aureus (11-13-23 @ 08:39)  Organism: Staphylococcus aureus (11-13-23 @ 08:39)      Method Type: AYLEEN      -  Ampicillin/Sulbactam: S <=8/4      -  Cefazolin: S <=4      -  Clindamycin: R 0.5 This isolate is presumed to be clindamycin resistant based on detection of inducible resistance. Clindamycin may still be effective in some patients.      -  Erythromycin: R >4      -  Gentamicin: S <=1 Should not be used as monotherapy      -  Oxacillin: S <=0.25 Oxacillin predicts susceptibility for dicloxacillin, methicillin, and nafcillin      -  Penicillin: R 8      -  Rifampin: S <=1 Should not be used as monotherapy      -  Tetracycline: S <=1      -  Trimethoprim/Sulfamethoxazole: S <=0.5/9.5      -  Vancomycin: S 1  Organism: Blood Culture PCR (11-13-23 @ 08:39)      Method Type: PCR      -  Methicillin SENSITIVE Staphylococcus aureus (MSSA): Detec Any isolate of Staphylococcus aureus from a blood culture is NOT considered a contaminant.    Culture - Blood (collected 11-10-23 @ 23:00)  Source: .Blood Blood-Peripheral  Gram Stain (11-11-23 @ 17:03):    Growth in aerobic and anaerobic bottles: Gram Positive Cocci in Clusters  Final Report (11-13-23 @ 08:40):    Growth in aerobic and anaerobic bottles: Staphylococcus aureus    See previous culture [id number]          Radiology: reviewed      
Patient is a 68y old  Male who presents with a chief complaint of shortness of breath (12 Nov 2023 13:50)        INTERVAL HPI/OVERNIGHT EVENTS:   no complaints  pt seen and examined         Vital Signs Last 24 Hrs  T(C): 36.4 (12 Nov 2023 11:36), Max: 36.7 (12 Nov 2023 04:48)  T(F): 97.5 (12 Nov 2023 11:36), Max: 98 (12 Nov 2023 04:48)  HR: 76 (12 Nov 2023 13:07) (76 - 101)  BP: 145/78 (12 Nov 2023 11:36) (107/70 - 145/78)  BP(mean): --  RR: 18 (12 Nov 2023 11:36) (18 - 19)  SpO2: 96% (12 Nov 2023 13:07) (94% - 97%)    Parameters below as of 12 Nov 2023 13:07  Patient On (Oxygen Delivery Method): nasal cannula, 3L        acetaminophen     Tablet .. 650 milliGRAM(s) Oral every 6 hours PRN  albuterol    0.083% 2.5 milliGRAM(s) Nebulizer every 3 hours PRN  albuterol/ipratropium for Nebulization 3 milliLiter(s) Nebulizer every 6 hours  ALPRAZolam 0.25 milliGRAM(s) Oral at bedtime PRN  aluminum hydroxide/magnesium hydroxide/simethicone Suspension 30 milliLiter(s) Oral every 4 hours PRN  aspirin enteric coated 81 milliGRAM(s) Oral daily  ceFAZolin   IVPB 2000 milliGRAM(s) IV Intermittent every 8 hours  ceFAZolin   IVPB      heparin   Injectable 6000 Unit(s) IV Push every 6 hours PRN  heparin   Injectable 3000 Unit(s) IV Push every 6 hours PRN  heparin  Infusion.  Unit(s)/Hr IV Continuous <Continuous>  hydrocortisone 2.5% Cream 1 Application(s) Topical daily PRN  HYDROmorphone   Tablet 4 milliGRAM(s) Oral every 3 hours PRN  influenza  Vaccine (HIGH DOSE) 0.7 milliLiter(s) IntraMuscular once  melatonin 3 milliGRAM(s) Oral at bedtime PRN  nicotine  Polacrilex Gum 4 milliGRAM(s) Oral every 6 hours  ondansetron Injectable 4 milliGRAM(s) IV Push every 8 hours PRN  predniSONE   Tablet 20 milliGRAM(s) Oral daily  triamcinolone 0.1% Cream 1 Application(s) Topical every 12 hours PRN      PHYSICAL EXAM:  GENERAL: NAD   EYES: conjunctiva and sclera clear  ENMT: Moist mucous membranes  NECK: Supple, No JVD, Normal thyroid  CHEST/LUNG: non labored, cta b/l  HEART: Regular rate and rhythm; No murmurs, rubs, or gallops  ABDOMEN: Soft, Nontender, Nondistended; Bowel sounds present  EXTREMITIES:  2+ Peripheral Pulses, No clubbing, no cyanosis, no edema  LYMPH: No lymphadenopathy noted  SKIN: No rashes or lesions  NEURO: no focal deficits    Consultant(s) Notes Reviewed:  [x ] YES  [ ] NO  Care Discussed with Consultants/Other Providers [ x] YES  [ ] NO    LABS:                        12.5   8.36  )-----------( 329      ( 12 Nov 2023 04:27 )             38.3     11-12    144  |  110<H>  |  22  ----------------------------<  123<H>  3.1<L>   |  28  |  1.10    Ca    8.3<L>      12 Nov 2023 04:27    TPro  7.6  /  Alb  3.3  /  TBili  1.2  /  DBili  x   /  AST  16  /  ALT  21  /  AlkPhos  151<H>  11-11    PT/INR - ( 10 Nov 2023 23:00 )   PT: 12.2 sec;   INR: 1.04 ratio         PTT - ( 12 Nov 2023 12:37 )  PTT:65.8 sec  Urinalysis Basic - ( 12 Nov 2023 04:27 )    Color: x / Appearance: x / SG: x / pH: x  Gluc: 123 mg/dL / Ketone: x  / Bili: x / Urobili: x   Blood: x / Protein: x / Nitrite: x   Leuk Esterase: x / RBC: x / WBC x   Sq Epi: x / Non Sq Epi: x / Bacteria: x      CAPILLARY BLOOD GLUCOSE            Urinalysis Basic - ( 12 Nov 2023 04:27 )    Color: x / Appearance: x / SG: x / pH: x  Gluc: 123 mg/dL / Ketone: x  / Bili: x / Urobili: x   Blood: x / Protein: x / Nitrite: x   Leuk Esterase: x / RBC: x / WBC x   Sq Epi: x / Non Sq Epi: x / Bacteria: x        Culture - Urine (collected 11 Nov 2023 00:44)  Source: Clean Catch Clean Catch (Midstream)  Final Report (11 Nov 2023 23:41):    <10,000 CFU/mL Normal Urogenital Cindy    Culture - Blood (collected 10 Nov 2023 23:15)  Source: .Blood Blood-Peripheral  Gram Stain (11 Nov 2023 17:11):    Growth in aerobic bottle: Gram Positive Cocci in Clusters    Growth in anaerobic bottle: Gram Positive Cocci in Clusters  Preliminary Report (12 Nov 2023 14:00):    Growth in aerobic and anaerobic bottles: Staphylococcus aureus    Susceptibility to follow.    Direct identification is available within approximately 3-5    hours either by Blood Panel Multiplexed PCR or Direct    MALDI-TOF. Details: [email or web address]  Organism: Blood Culture PCR (11 Nov 2023 16:24)  Organism: Blood Culture PCR (11 Nov 2023 16:24)    Culture - Blood (collected 10 Nov 2023 23:00)  Source: .Blood Blood-Peripheral  Gram Stain (11 Nov 2023 17:03):    Growth in aerobic and anaerobic bottles: Gram Positive Cocci in Clusters  Preliminary Report (12 Nov 2023 14:01):    Growth in aerobic and anaerobic bottles: Staphylococcus aureus    See previous culture [id number]        RADIOLOGY & ADDITIONAL TESTS:    Imaging Personally Reviewed  Reviewed consultants input
Date/Time Patient Seen:  		  Referring MD:   Data Reviewed	       Patient is a 68y old  Male who presents with a chief complaint of shortness of breath (11 Nov 2023 17:51)      Subjective/HPI     PAST MEDICAL & SURGICAL HISTORY:  HTN (hypertension)    Degenerative disc disease, cervical    Chronic neck pain    BPH (benign prostatic hyperplasia)    T-cell lymphoma    S/P hernia repair          Medication list         MEDICATIONS  (STANDING):  albuterol/ipratropium for Nebulization 3 milliLiter(s) Nebulizer every 6 hours  aspirin enteric coated 81 milliGRAM(s) Oral daily  ceFAZolin   IVPB 2000 milliGRAM(s) IV Intermittent every 8 hours  ceFAZolin   IVPB      heparin  Infusion.  Unit(s)/Hr (13 mL/Hr) IV Continuous <Continuous>  influenza  Vaccine (HIGH DOSE) 0.7 milliLiter(s) IntraMuscular once  nicotine  Polacrilex Gum 4 milliGRAM(s) Oral every 6 hours  predniSONE   Tablet 20 milliGRAM(s) Oral daily    MEDICATIONS  (PRN):  acetaminophen     Tablet .. 650 milliGRAM(s) Oral every 6 hours PRN Temp greater or equal to 38C (100.4F), Mild Pain (1 - 3)  albuterol    0.083% 2.5 milliGRAM(s) Nebulizer every 3 hours PRN Shortness of Breath and/or Wheezing  ALPRAZolam 0.25 milliGRAM(s) Oral at bedtime PRN Anxiety  aluminum hydroxide/magnesium hydroxide/simethicone Suspension 30 milliLiter(s) Oral every 4 hours PRN Dyspepsia  heparin   Injectable 6000 Unit(s) IV Push every 6 hours PRN For aPTT less than 40  heparin   Injectable 3000 Unit(s) IV Push every 6 hours PRN For aPTT between 40 - 57  hydrocortisone 2.5% Cream 1 Application(s) Topical daily PRN Rash and/or Itching  HYDROmorphone   Tablet 4 milliGRAM(s) Oral every 3 hours PRN Moderate Pain (4 - 6)  melatonin 3 milliGRAM(s) Oral at bedtime PRN Insomnia  ondansetron Injectable 4 milliGRAM(s) IV Push every 8 hours PRN Nausea and/or Vomiting  triamcinolone 0.1% Cream 1 Application(s) Topical every 12 hours PRN skin irritation         Vitals log        ICU Vital Signs Last 24 Hrs  T(C): 36.7 (12 Nov 2023 04:48), Max: 36.7 (12 Nov 2023 04:48)  T(F): 98 (12 Nov 2023 04:48), Max: 98 (12 Nov 2023 04:48)  HR: 84 (12 Nov 2023 04:48) (84 - 96)  BP: 107/70 (12 Nov 2023 04:48) (107/70 - 112/77)  BP(mean): --  ABP: --  ABP(mean): --  RR: 19 (12 Nov 2023 04:48) (19 - 20)  SpO2: 97% (12 Nov 2023 04:48) (93% - 97%)    O2 Parameters below as of 12 Nov 2023 04:48  Patient On (Oxygen Delivery Method): nasal cannula  O2 Flow (L/min): 4               Input and Output:  I&O's Detail    11 Nov 2023 07:01  -  12 Nov 2023 05:44  --------------------------------------------------------  IN:    Oral Fluid: 240 mL  Total IN: 240 mL    OUT:  Total OUT: 0 mL    Total NET: 240 mL          Lab Data                        15.0   8.31  )-----------( 338      ( 11 Nov 2023 08:57 )             46.4     11-11    142  |  106  |  16  ----------------------------<  200<H>  3.7   |  27  |  1.10    Ca    9.0      11 Nov 2023 08:57    TPro  7.6  /  Alb  3.3  /  TBili  1.2  /  DBili  x   /  AST  16  /  ALT  21  /  AlkPhos  151<H>  11-11      CARDIAC MARKERS ( 11 Nov 2023 08:57 )  x     / x     / 97 U/L / x     / 4.6 ng/mL  CARDIAC MARKERS ( 11 Nov 2023 01:32 )  x     / x     / 79 U/L / x     / 3.6 ng/mL  CARDIAC MARKERS ( 10 Nov 2023 23:00 )  x     / x     / 73 U/L / x     / x            Review of Systems	      Objective     Physical Examination    heart s1s2  lung dc BS  head nc      Pertinent Lab findings & Imaging      Jennifer:  NO   Adequate UO     I&O's Detail    11 Nov 2023 07:01  -  12 Nov 2023 05:44  --------------------------------------------------------  IN:    Oral Fluid: 240 mL  Total IN: 240 mL    OUT:  Total OUT: 0 mL    Total NET: 240 mL               Discussed with:     Cultures:	        Radiology                            
[facility], Division of Infectious Diseases  LILY Savage Y. Patel, S. Shah, G. [facility]  [phone]    Name: ZACKERY CAPPS  Age: 68y  Gender: Male  MRN: [id number]    Interval History:  Patient seen and examined at bedside this morning  No acute overnight events. Afebrile  No complaints  Notes reviewed    Antibiotics:  ceFAZolin   IVPB 2000 milliGRAM(s) IV Intermittent every 8 hours  ceFAZolin   IVPB          Medications:  acetaminophen     Tablet .. 650 milliGRAM(s) Oral every 6 hours PRN  albuterol    0.083% 2.5 milliGRAM(s) Nebulizer every 3 hours PRN  albuterol/ipratropium for Nebulization 3 milliLiter(s) Nebulizer every 6 hours  ALPRAZolam 0.25 milliGRAM(s) Oral at bedtime PRN  aluminum hydroxide/magnesium hydroxide/simethicone Suspension 30 milliLiter(s) Oral every 4 hours PRN  aspirin enteric coated 81 milliGRAM(s) Oral daily  ceFAZolin   IVPB 2000 milliGRAM(s) IV Intermittent every 8 hours  ceFAZolin   IVPB      heparin   Injectable 6000 Unit(s) IV Push every 6 hours PRN  heparin   Injectable 3000 Unit(s) IV Push every 6 hours PRN  heparin  Infusion.  Unit(s)/Hr IV Continuous <Continuous>  hydrocortisone 2.5% Cream 1 Application(s) Topical daily PRN  HYDROmorphone   Tablet 4 milliGRAM(s) Oral every 3 hours PRN  influenza  Vaccine (HIGH DOSE) 0.7 milliLiter(s) IntraMuscular once  melatonin 3 milliGRAM(s) Oral at bedtime PRN  nicotine  Polacrilex Gum 4 milliGRAM(s) Oral every 6 hours  ondansetron Injectable 4 milliGRAM(s) IV Push every 8 hours PRN  predniSONE   Tablet 20 milliGRAM(s) Oral daily  triamcinolone 0.1% Cream 1 Application(s) Topical every 12 hours PRN      Review of Systems:  A 10-point review of systems was obtained.   Review of systems otherwise negative except as previously noted.    Allergies: No Known Allergies    For details regarding the patient's past medical history, social history, family history, and other miscellaneous elements, please refer the initial infectious diseases consultation and/or the admitting history and physical examination for this admission.    Objective:  Vitals:   T(C): 36.8 (11-13-23 @ 11:52), Max: 37.3 (11-12-23 @ 20:05)  HR: 81 (11-13-23 @ 11:52) (80 - 99)  BP: 133/84 (11-13-23 @ 11:52) (103/65 - 133/84)  RR: 18 (11-13-23 @ 11:52) (18 - 18)  SpO2: 96% (11-13-23 @ 11:52) (94% - 97%)    Physical Examination:  General: no acute distress  HEENT: NC/AT, EOMI,   Cardio: S1, S2 heard, RRR, no murmurs  Resp: decreased breath sounds  Abd: soft, NT, ND,  Ext: no edema or cyanosis  Skin: skin lesions across face      Laboratory Studies:  CBC:                       12.2   8.60  )-----------( 323      ( 13 Nov 2023 07:54 )             38.1     CMP: 11-13    144  |  111<H>  |  17  ----------------------------<  110<H>  4.0   |  26  |  1.10    Ca    8.5      13 Nov 2023 07:54        Urinalysis Basic - ( 13 Nov 2023 07:54 )    Color: x / Appearance: x / SG: x / pH: x  Gluc: 110 mg/dL / Ketone: x  / Bili: x / Urobili: x   Blood: x / Protein: x / Nitrite: x   Leuk Esterase: x / RBC: x / WBC x   Sq Epi: x / Non Sq Epi: x / Bacteria: x        Microbiology: reviewed    Culture - Blood (collected 11-12-23 @ 04:27)  Source: .Blood Blood  Preliminary Report (11-13-23 @ 12:01):    No growth at 24 hours    Culture - Blood (collected 11-12-23 @ 04:27)  Source: .Blood Blood  Preliminary Report (11-13-23 @ 12:01):    No growth at 24 hours    Culture - Urine (collected 11-11-23 @ 00:44)  Source: Clean Catch Clean Catch (Midstream)  Final Report (11-11-23 @ 23:41):    <10,000 CFU/mL Normal Urogenital Cindy    Culture - Blood (collected 11-10-23 @ 23:15)  Source: .Blood Blood-Peripheral  Gram Stain (11-11-23 @ 17:11):    Growth in aerobic bottle: Gram Positive Cocci in Clusters    Growth in anaerobic bottle: Gram Positive Cocci in Clusters  Final Report (11-13-23 @ 08:39):    Growth in aerobic and anaerobic bottles: Staphylococcus aureus    Direct identification is available within approximately 3-5    hours either by Blood Panel Multiplexed PCR or Direct    MALDI-TOF. Details: [email or web address]  Organism: Blood Culture PCR  Staphylococcus aureus (11-13-23 @ 08:39)  Organism: Staphylococcus aureus (11-13-23 @ 08:39)      Method Type: AYLEEN      -  Ampicillin/Sulbactam: S <=8/4      -  Cefazolin: S <=4      -  Clindamycin: R 0.5 This isolate is presumed to be clindamycin resistant based on detection of inducible resistance. Clindamycin may still be effective in some patients.      -  Erythromycin: R >4      -  Gentamicin: S <=1 Should not be used as monotherapy      -  Oxacillin: S <=0.25 Oxacillin predicts susceptibility for dicloxacillin, methicillin, and nafcillin      -  Penicillin: R 8      -  Rifampin: S <=1 Should not be used as monotherapy      -  Tetracycline: S <=1      -  Trimethoprim/Sulfamethoxazole: S <=0.5/9.5      -  Vancomycin: S 1  Organism: Blood Culture PCR (11-13-23 @ 08:39)      Method Type: PCR      -  Methicillin SENSITIVE Staphylococcus aureus (MSSA): Detec Any isolate of Staphylococcus aureus from a blood culture is NOT considered a contaminant.    Culture - Blood (collected 11-10-23 @ 23:00)  Source: .Blood Blood-Peripheral  Gram Stain (11-11-23 @ 17:03):    Growth in aerobic and anaerobic bottles: Gram Positive Cocci in Clusters  Final Report (11-13-23 @ 08:40):    Growth in aerobic and anaerobic bottles: Staphylococcus aureus    See previous culture [id number]          Radiology: reviewed      
[facility], Division of Infectious Diseases  LILY Savage Y. Patel, S. Shah, G. [facility]  [phone]    Name: ZACKERY CAPPS  Age: 68y  Gender: Male  MRN: [id number]    Interval History:  Patient seen and examined at bedside this morning  No acute overnight events. Afebrile  No complaints  Notes reviewed    Antibiotics:  cefTRIAXone   IVPB 2000 milliGRAM(s) IV Intermittent every 24 hours      Medications:  acetaminophen     Tablet .. 650 milliGRAM(s) Oral every 6 hours PRN  albuterol    0.083% 2.5 milliGRAM(s) Nebulizer every 3 hours PRN  albuterol/ipratropium for Nebulization 3 milliLiter(s) Nebulizer every 6 hours  ALPRAZolam 0.25 milliGRAM(s) Oral at bedtime PRN  aluminum hydroxide/magnesium hydroxide/simethicone Suspension 30 milliLiter(s) Oral every 4 hours PRN  aspirin enteric coated 81 milliGRAM(s) Oral daily  cefTRIAXone   IVPB 2000 milliGRAM(s) IV Intermittent every 24 hours  heparin   Injectable 6000 Unit(s) IV Push every 6 hours PRN  heparin   Injectable 3000 Unit(s) IV Push every 6 hours PRN  heparin  Infusion.  Unit(s)/Hr IV Continuous <Continuous>  hydrocortisone 2.5% Cream 1 Application(s) Topical daily PRN  HYDROmorphone   Tablet 4 milliGRAM(s) Oral every 3 hours PRN  influenza  Vaccine (HIGH DOSE) 0.7 milliLiter(s) IntraMuscular once  melatonin 3 milliGRAM(s) Oral at bedtime PRN  nicotine  Polacrilex Gum 4 milliGRAM(s) Oral every 6 hours  ondansetron Injectable 4 milliGRAM(s) IV Push every 8 hours PRN  predniSONE   Tablet 20 milliGRAM(s) Oral daily  triamcinolone 0.1% Cream 1 Application(s) Topical every 12 hours PRN      Review of Systems:  A 10-point review of systems was obtained.   Review of systems otherwise negative except as previously noted.    Allergies: No Known Allergies    For details regarding the patient's past medical history, social history, family history, and other miscellaneous elements, please refer the initial infectious diseases consultation and/or the admitting history and physical examination for this admission.    Objective:  Vitals:   T(C): 36.6 (11-15-23 @ 04:50), Max: 37 (11-14-23 @ 20:04)  HR: 71 (11-15-23 @ 04:50) (71 - 86)  BP: 138/84 (11-15-23 @ 04:50) (122/76 - 138/84)  RR: 18 (11-15-23 @ 04:50) (18 - 18)  SpO2: 97% (11-15-23 @ 04:50) (94% - 97%)    Physical Examination:  General: no acute distress  HEENT: NC/AT, EOMI  Cardio: S1, S2 heard, RRR, no murmurs  Resp: symmetric chest rise  Abd: soft, NT, ND,   Ext: no edema or cyanosis  Skin: warm, dry, multiple lesions across body      Laboratory Studies:  CBC:                       13.5   9.04  )-----------( 342      ( 15 Nov 2023 09:12 )             40.5     CMP:             Microbiology: reviewed    Culture - Blood (collected 11-12-23 @ 04:27)  Source: .Blood Blood  Preliminary Report (11-14-23 @ 12:01):    No growth at 48 Hours    Culture - Blood (collected 11-12-23 @ 04:27)  Source: .Blood Blood  Preliminary Report (11-14-23 @ 12:01):    No growth at 48 Hours    Culture - Urine (collected 11-11-23 @ 00:44)  Source: Clean Catch Clean Catch (Midstream)  Final Report (11-11-23 @ 23:41):    <10,000 CFU/mL Normal Urogenital Cindy    Culture - Blood (collected 11-10-23 @ 23:15)  Source: .Blood Blood-Peripheral  Gram Stain (11-11-23 @ 17:11):    Growth in aerobic bottle: Gram Positive Cocci in Clusters    Growth in anaerobic bottle: Gram Positive Cocci in Clusters  Final Report (11-13-23 @ 08:39):    Growth in aerobic and anaerobic bottles: Staphylococcus aureus    Direct identification is available within approximately 3-5    hours either by Blood Panel Multiplexed PCR or Direct    MALDI-TOF. Details: [email or web address]  Organism: Blood Culture PCR  Staphylococcus aureus (11-13-23 @ 08:39)  Organism: Staphylococcus aureus (11-13-23 @ 08:39)      Method Type: AYLEEN      -  Ampicillin/Sulbactam: S <=8/4      -  Cefazolin: S <=4      -  Clindamycin: R 0.5 This isolate is presumed to be clindamycin resistant based on detection of inducible resistance. Clindamycin may still be effective in some patients.      -  Erythromycin: R >4      -  Gentamicin: S <=1 Should not be used as monotherapy      -  Oxacillin: S <=0.25 Oxacillin predicts susceptibility for dicloxacillin, methicillin, and nafcillin      -  Penicillin: R 8      -  Rifampin: S <=1 Should not be used as monotherapy      -  Tetracycline: S <=1      -  Trimethoprim/Sulfamethoxazole: S <=0.5/9.5      -  Vancomycin: S 1  Organism: Blood Culture PCR (11-13-23 @ 08:39)      Method Type: PCR      -  Methicillin SENSITIVE Staphylococcus aureus (MSSA): Detec Any isolate of Staphylococcus aureus from a blood culture is NOT considered a contaminant.    Culture - Blood (collected 11-10-23 @ 23:00)  Source: .Blood Blood-Peripheral  Gram Stain (11-11-23 @ 17:03):    Growth in aerobic and anaerobic bottles: Gram Positive Cocci in Clusters  Final Report (11-13-23 @ 08:40):    Growth in aerobic and anaerobic bottles: Staphylococcus aureus    See previous culture [id number]          Radiology: reviewed      
Patient is a 68y old  Male who presents with a chief complaint of shortness of breath (13 Nov 2023 13:28)        INTERVAL HPI/OVERNIGHT EVENTS:   no complaints  pt seen and examined         Vital Signs Last 24 Hrs  T(C): 36.8 (13 Nov 2023 11:52), Max: 37.3 (12 Nov 2023 20:05)  T(F): 98.2 (13 Nov 2023 11:52), Max: 99.1 (12 Nov 2023 20:05)  HR: 82 (13 Nov 2023 14:20) (80 - 99)  BP: 133/84 (13 Nov 2023 11:52) (103/65 - 133/84)  BP(mean): --  RR: 18 (13 Nov 2023 11:52) (18 - 18)  SpO2: 97% (13 Nov 2023 14:20) (94% - 97%)    Parameters below as of 13 Nov 2023 14:20  Patient On (Oxygen Delivery Method): nasal cannula, 3L        acetaminophen     Tablet .. 650 milliGRAM(s) Oral every 6 hours PRN  albuterol    0.083% 2.5 milliGRAM(s) Nebulizer every 3 hours PRN  albuterol/ipratropium for Nebulization 3 milliLiter(s) Nebulizer every 6 hours  ALPRAZolam 0.25 milliGRAM(s) Oral at bedtime PRN  aluminum hydroxide/magnesium hydroxide/simethicone Suspension 30 milliLiter(s) Oral every 4 hours PRN  aspirin enteric coated 81 milliGRAM(s) Oral daily  ceFAZolin   IVPB      ceFAZolin   IVPB 2000 milliGRAM(s) IV Intermittent every 8 hours  heparin   Injectable 6000 Unit(s) IV Push every 6 hours PRN  heparin   Injectable 3000 Unit(s) IV Push every 6 hours PRN  heparin  Infusion.  Unit(s)/Hr IV Continuous <Continuous>  hydrocortisone 2.5% Cream 1 Application(s) Topical daily PRN  HYDROmorphone   Tablet 4 milliGRAM(s) Oral every 3 hours PRN  influenza  Vaccine (HIGH DOSE) 0.7 milliLiter(s) IntraMuscular once  melatonin 3 milliGRAM(s) Oral at bedtime PRN  nicotine  Polacrilex Gum 4 milliGRAM(s) Oral every 6 hours  ondansetron Injectable 4 milliGRAM(s) IV Push every 8 hours PRN  predniSONE   Tablet 20 milliGRAM(s) Oral daily  triamcinolone 0.1% Cream 1 Application(s) Topical every 12 hours PRN      PHYSICAL EXAM:  GENERAL: NAD   EYES: conjunctiva and sclera clear  ENMT: Moist mucous membranes  NECK: Supple, No JVD, Normal thyroid  CHEST/LUNG: non labored, cta b/l  HEART: Regular rate and rhythm; No murmurs, rubs, or gallops  ABDOMEN: Soft, Nontender, Nondistended; Bowel sounds present  EXTREMITIES:  2+ Peripheral Pulses, No clubbing, no cyanosis, no edema  LYMPH: No lymphadenopathy noted  SKIN: No rashes or new lesions  NEURO: no focal deficits    Consultant(s) Notes Reviewed:  [x ] YES  [ ] NO  Care Discussed with Consultants/Other Providers [ x] YES  [ ] NO    LABS:                        12.2   8.60  )-----------( 323      ( 13 Nov 2023 07:54 )             38.1     11-13    144  |  111<H>  |  17  ----------------------------<  110<H>  4.0   |  26  |  1.10    Ca    8.5      13 Nov 2023 07:54      PTT - ( 13 Nov 2023 15:40 )  PTT:81.7 sec  Urinalysis Basic - ( 13 Nov 2023 07:54 )    Color: x / Appearance: x / SG: x / pH: x  Gluc: 110 mg/dL / Ketone: x  / Bili: x / Urobili: x   Blood: x / Protein: x / Nitrite: x   Leuk Esterase: x / RBC: x / WBC x   Sq Epi: x / Non Sq Epi: x / Bacteria: x      CAPILLARY BLOOD GLUCOSE            Urinalysis Basic - ( 13 Nov 2023 07:54 )    Color: x / Appearance: x / SG: x / pH: x  Gluc: 110 mg/dL / Ketone: x  / Bili: x / Urobili: x   Blood: x / Protein: x / Nitrite: x   Leuk Esterase: x / RBC: x / WBC x   Sq Epi: x / Non Sq Epi: x / Bacteria: x        Culture - Blood (collected 12 Nov 2023 04:27)  Source: .Blood Blood  Preliminary Report (13 Nov 2023 12:01):    No growth at 24 hours    Culture - Blood (collected 12 Nov 2023 04:27)  Source: .Blood Blood  Preliminary Report (13 Nov 2023 12:01):    No growth at 24 hours    Culture - Urine (collected 11 Nov 2023 00:44)  Source: Clean Catch Clean Catch (Midstream)  Final Report (11 Nov 2023 23:41):    <10,000 CFU/mL Normal Urogenital Cindy    Culture - Blood (collected 10 Nov 2023 23:15)  Source: .Blood Blood-Peripheral  Gram Stain (11 Nov 2023 17:11):    Growth in aerobic bottle: Gram Positive Cocci in Clusters    Growth in anaerobic bottle: Gram Positive Cocci in Clusters  Final Report (13 Nov 2023 08:39):    Growth in aerobic and anaerobic bottles: Staphylococcus aureus    Direct identification is available within approximately 3-5    hours either by Blood Panel Multiplexed PCR or Direct    MALDI-TOF. Details: [email or web address]  Organism: Blood Culture PCR  Staphylococcus aureus (13 Nov 2023 08:39)  Organism: Staphylococcus aureus (13 Nov 2023 08:39)  Organism: Blood Culture PCR (13 Nov 2023 08:39)    Culture - Blood (collected 10 Nov 2023 23:00)  Source: .Blood Blood-Peripheral  Gram Stain (11 Nov 2023 17:03):    Growth in aerobic and anaerobic bottles: Gram Positive Cocci in Clusters  Final Report (13 Nov 2023 08:40):    Growth in aerobic and anaerobic bottles: Staphylococcus aureus    See previous culture [id number]        RADIOLOGY & ADDITIONAL TESTS:    Imaging Personally Reviewed  Reviewed consultants input

## 2023-11-17 LAB
CULTURE RESULTS: SIGNIFICANT CHANGE UP
SPECIMEN SOURCE: SIGNIFICANT CHANGE UP

## 2024-01-29 ENCOUNTER — EMERGENCY (EMERGENCY)
Facility: HOSPITAL | Age: 69
LOS: 1 days | Discharge: HOME | End: 2024-01-29
Attending: EMERGENCY MEDICINE | Admitting: EMERGENCY MEDICINE
Payer: MEDICARE

## 2024-01-29 VITALS
RESPIRATION RATE: 18 BRPM | HEIGHT: 64 IN | WEIGHT: 160.06 LBS | TEMPERATURE: 97 F | DIASTOLIC BLOOD PRESSURE: 67 MMHG | OXYGEN SATURATION: 95 % | SYSTOLIC BLOOD PRESSURE: 106 MMHG | HEART RATE: 95 BPM

## 2024-01-29 VITALS
SYSTOLIC BLOOD PRESSURE: 110 MMHG | OXYGEN SATURATION: 96 % | RESPIRATION RATE: 18 BRPM | DIASTOLIC BLOOD PRESSURE: 68 MMHG | HEART RATE: 96 BPM | TEMPERATURE: 98 F

## 2024-01-29 DIAGNOSIS — Z98.890 OTHER SPECIFIED POSTPROCEDURAL STATES: Chronic | ICD-10-CM

## 2024-01-29 PROBLEM — C85.90 NON-HODGKIN LYMPHOMA, UNSPECIFIED, UNSPECIFIED SITE: Chronic | Status: ACTIVE | Noted: 2023-11-11

## 2024-01-29 PROCEDURE — 99283 EMERGENCY DEPT VISIT LOW MDM: CPT | Mod: 25

## 2024-01-29 PROCEDURE — 99283 EMERGENCY DEPT VISIT LOW MDM: CPT | Mod: FS

## 2024-01-29 NOTE — ED ADULT NURSE NOTE - SUICIDE SCREENING QUESTION 2
----- Message from Mery Fung sent at 8/21/2020 10:59 AM CDT -----  Amanda with [facility] is calling to let you know that patient is currently with [facility]. Her number is [phone].  Thank you!     No

## 2024-01-29 NOTE — ED PROVIDER NOTE - PATIENT PORTAL LINK FT
You can access the FollowMyHealth Patient Portal offered by [facility] by registering at the following website: [email or web address]. By joining [facility]’s FollowMyHealth portal, you will also be able to view your health information using other applications (apps) compatible with our system.

## 2024-01-29 NOTE — ED PROVIDER NOTE - DIFFERENTIAL DIAGNOSIS
Differential Diagnosis Patient presenting to the emergency room with a report of bleeding from wound.  Bleeding has since stopped.  Offered to obtain labs the patient and wife would like to just be discharged is a concern that remaining in the ER will put him at risk for a superimposed skin infection.  Wife does report the patient did have labs done on the 17th his clotting factors and platelet count were normal at that time.  Will discharge strict follow-up precautions reviewed.

## 2024-01-29 NOTE — ED PROVIDER NOTE - CLINICAL SUMMARY MEDICAL DECISION MAKING FREE TEXT BOX
Patient is a 68-year-old male who presents to the emergency room with a chief complaint of bleeding from a facial scab.  Past medical history of CTCL, T-cell lymphoma hypertension.  Wife reports that patient has had a progressive cognitive decline that his providers related to his cancer.  Occasionally he will be picking at his facial scabs without even realizing it.  Patient woke up this morning with blood on his pillow.  She noted bleeding from one of the scabs near the left ear.  She reports that she put quick clot powder on the wound area with fairly good control but there was 1 area where she could not get the quick clot to hold.  She became concerned and came to the emergency room.  On arrival to the ER they held direct pressure and bleeding has since resolved.  Patient had been on Eliquis but was taken off 2 weeks ago.  Oertfranco they are waiting to start a new chemotherapy regimen with his oncologist at [facility].  Denies any chest pain shortness of breath or abdominal pain.  Vitals are stable.  On exam patient is sitting up no acute distress multiple facial lesions are noted with overlying scabs no active bleeding at this time.  Patient presenting to the emergency room with a report of bleeding from wound.  Bleeding has since stopped.  Offered to obtain labs the patient and wife would like to just be discharged is a concern that remaining in the ER will put him at risk for a superimposed skin infection.  Wife does report the patient did have labs done on the 17th his clotting factors and platelet count were normal at that time.  Will discharge strict follow-up precautions reviewed.

## 2024-01-29 NOTE — ED ADULT NURSE NOTE - CHIEF COMPLAINT QUOTE
Pt ambulatory to triage c/o significant bleeding from scab behind the left ear. Pt states he has a hx of CTCL with multiple wounds on face and is awaiting new chemo tx. Pt states he stopped Eliquis due to frequent nose bleeds. Pt denies feeling dizzy or weak. Site scabbed with dressing in place - no active bleeding noted.

## 2024-01-29 NOTE — ED PROVIDER NOTE - NSFOLLOWUPINSTRUCTIONS_ED_ALL_ED_FT
Follow up with your oncologist. Take the copy of your test results you were given in the emergency room for your doctor to review.     Hold pressure for 5-10 minutes when you have bleeding to help control the bleeding    Stay hydrated    Return to the ER if your symptoms worsen or for any other medical emergencies

## 2024-01-29 NOTE — ED ADULT NURSE NOTE - OBJECTIVE STATEMENT
Bleeding from scab behind the left ear. Pt has numerous wounds on dace and neck. Pt states he has a hx of CTCL with multiple wounds on face and is awaiting new chemo tx. Pt states he stopped Eliquis due to frequent nose bleeds.

## 2024-01-29 NOTE — ED PROVIDER NOTE - OBJECTIVE STATEMENT
68-year-old male with PMH of CTCL, T-cell lymphoma, HTN Presents to the ED with his wife for bleeding scab behind the left ear.  Patient has multiple skin tumors, wife reports he is always picking at his scabs she believes that is what caused his bleeding.  When he woke up this morning there was blood on his pillow.  She put quick clot powder on the wound and they held pressure, when they arrived to the ED the bleeding stopped.  Patient was recently taken off Eliquis 2 weeks ago.  They are currently waiting to start a new chemo regimen with his oncologist at [facility].  No complaints of dizziness, chest pain, shortness of breath or all other complaints.

## 2024-01-29 NOTE — ED PROVIDER NOTE - PHYSICAL EXAMINATION
Gen: Well appearing in NAD.   Head: atraumatic  Neuro: AAO x3, patient moving all extremity equally  Skin: Multiple skin tumors noted on the face and scalp.  Tumor behind the left ear is no longer bleeding.  No bleeding noted anywhere else.  Psych: Alert and oriented

## 2024-01-29 NOTE — ED ADULT TRIAGE NOTE - CHIEF COMPLAINT QUOTE
Pt ambulatory to triage c/o significant bleeding from scab behind the left ear. Pt states he has a hx of CTCL with multiple wounds on face and is awaiting new chemo tx. Pt states he stopped Eliquis due to frequent nose bleeds. Pt denies feeling dizzy or weak. Pt ambulatory to triage c/o significant bleeding from scab behind the left ear. Pt states he has a hx of CTCL with multiple wounds on face and is awaiting new chemo tx. Pt states he stopped Eliquis due to frequent nose bleeds. Pt denies feeling dizzy or weak. Site scabbed with dressing in place - no active bleeding noted.

## 2024-05-07 NOTE — ED PROVIDER NOTE - LOCATION
MRN [id number]
MRN: [id number]
MRN: [id number]
Please print prescan out of Media Tab and then complete and sign.   Once form is completed and signed, please fax to [phone].    Please direct any questions to [phone], Option 1.   Thanks,  Forms Completion Team. 
nasal facial

## 2025-03-12 NOTE — ED ADULT NURSE NOTE - NS ED NURSE LEVEL OF CONSCIOUSNESS ORIENTATION
"Skylar Bridgeschapito Watson was seen and treated in our emergency department on 3/12/2025.  She may return to school on 03/13/2025.      If you have any questions or concerns, please don't hesitate to call.      Whitney Duvall, NP"
"Skylar Bridgeschapito Watson was seen and treated in our emergency department on 3/12/2025.  She may return to school on 03/13/2025.      If you have any questions or concerns, please don't hesitate to call.      Whitney Duvall, NP"
Oriented - self; Oriented - place; Oriented - time